# Patient Record
Sex: MALE | Race: WHITE | URBAN - METROPOLITAN AREA
[De-identification: names, ages, dates, MRNs, and addresses within clinical notes are randomized per-mention and may not be internally consistent; named-entity substitution may affect disease eponyms.]

---

## 2017-02-02 ENCOUNTER — EMERGENCY (EMERGENCY)
Facility: HOSPITAL | Age: 52
LOS: 1 days | Discharge: HOME | End: 2017-02-02
Attending: EMERGENCY MEDICINE | Admitting: EMERGENCY MEDICINE
Payer: COMMERCIAL

## 2017-02-02 VITALS
RESPIRATION RATE: 18 BRPM | TEMPERATURE: 98 F | HEART RATE: 80 BPM | OXYGEN SATURATION: 99 % | DIASTOLIC BLOOD PRESSURE: 73 MMHG | SYSTOLIC BLOOD PRESSURE: 134 MMHG

## 2017-02-02 VITALS — HEIGHT: 70 IN | WEIGHT: 225.09 LBS

## 2017-02-02 DIAGNOSIS — K57.92 DIVERTICULITIS OF INTESTINE, PART UNSPECIFIED, WITHOUT PERFORATION OR ABSCESS WITHOUT BLEEDING: ICD-10-CM

## 2017-02-02 DIAGNOSIS — R10.30 LOWER ABDOMINAL PAIN, UNSPECIFIED: ICD-10-CM

## 2017-02-02 LAB
ALBUMIN SERPL ELPH-MCNC: 3.8 G/DL — SIGNIFICANT CHANGE UP (ref 3.3–5)
ALP SERPL-CCNC: 67 U/L — SIGNIFICANT CHANGE UP (ref 40–120)
ALT FLD-CCNC: 63 U/L — SIGNIFICANT CHANGE UP (ref 12–78)
ANION GAP SERPL CALC-SCNC: 8 MMOL/L — SIGNIFICANT CHANGE UP (ref 5–17)
APPEARANCE UR: CLEAR — SIGNIFICANT CHANGE UP
AST SERPL-CCNC: 23 U/L — SIGNIFICANT CHANGE UP (ref 15–37)
BASOPHILS # BLD AUTO: 0 K/UL — SIGNIFICANT CHANGE UP (ref 0–0.2)
BASOPHILS NFR BLD AUTO: 0.5 % — SIGNIFICANT CHANGE UP (ref 0–2)
BILIRUB SERPL-MCNC: 0.4 MG/DL — SIGNIFICANT CHANGE UP (ref 0.2–1.2)
BILIRUB UR-MCNC: NEGATIVE — SIGNIFICANT CHANGE UP
BUN SERPL-MCNC: 8 MG/DL — SIGNIFICANT CHANGE UP (ref 7–23)
CALCIUM SERPL-MCNC: 8.9 MG/DL — SIGNIFICANT CHANGE UP (ref 8.5–10.1)
CHLORIDE SERPL-SCNC: 106 MMOL/L — SIGNIFICANT CHANGE UP (ref 96–108)
CO2 SERPL-SCNC: 25 MMOL/L — SIGNIFICANT CHANGE UP (ref 22–31)
COLOR SPEC: YELLOW — SIGNIFICANT CHANGE UP
CREAT SERPL-MCNC: 0.9 MG/DL — SIGNIFICANT CHANGE UP (ref 0.5–1.3)
DIFF PNL FLD: NEGATIVE — SIGNIFICANT CHANGE UP
EOSINOPHIL # BLD AUTO: 0 K/UL — SIGNIFICANT CHANGE UP (ref 0–0.5)
EOSINOPHIL NFR BLD AUTO: 0.3 % — SIGNIFICANT CHANGE UP (ref 0–6)
GLUCOSE SERPL-MCNC: 96 MG/DL — SIGNIFICANT CHANGE UP (ref 70–99)
GLUCOSE UR QL: NEGATIVE MG/DL — SIGNIFICANT CHANGE UP
HCT VFR BLD CALC: 42.6 % — SIGNIFICANT CHANGE UP (ref 39–50)
HGB BLD-MCNC: 14.7 G/DL — SIGNIFICANT CHANGE UP (ref 13–17)
KETONES UR-MCNC: NEGATIVE — SIGNIFICANT CHANGE UP
LEUKOCYTE ESTERASE UR-ACNC: NEGATIVE — SIGNIFICANT CHANGE UP
LIDOCAIN IGE QN: 150 U/L — SIGNIFICANT CHANGE UP (ref 73–393)
LYMPHOCYTES # BLD AUTO: 1.8 K/UL — SIGNIFICANT CHANGE UP (ref 1–3.3)
LYMPHOCYTES # BLD AUTO: 18.2 % — SIGNIFICANT CHANGE UP (ref 13–44)
MCHC RBC-ENTMCNC: 29.6 PG — SIGNIFICANT CHANGE UP (ref 27–34)
MCHC RBC-ENTMCNC: 34.6 GM/DL — SIGNIFICANT CHANGE UP (ref 32–36)
MCV RBC AUTO: 85.6 FL — SIGNIFICANT CHANGE UP (ref 80–100)
MONOCYTES # BLD AUTO: 0.5 K/UL — SIGNIFICANT CHANGE UP (ref 0–0.9)
MONOCYTES NFR BLD AUTO: 5.3 % — SIGNIFICANT CHANGE UP (ref 2–14)
NEUTROPHILS # BLD AUTO: 7.4 K/UL — SIGNIFICANT CHANGE UP (ref 1.8–7.4)
NEUTROPHILS NFR BLD AUTO: 75.7 % — SIGNIFICANT CHANGE UP (ref 43–77)
NITRITE UR-MCNC: NEGATIVE — SIGNIFICANT CHANGE UP
PH UR: 6 — SIGNIFICANT CHANGE UP (ref 4.8–8)
PLATELET # BLD AUTO: 284 K/UL — SIGNIFICANT CHANGE UP (ref 150–400)
POTASSIUM SERPL-MCNC: 4 MMOL/L — SIGNIFICANT CHANGE UP (ref 3.5–5.3)
POTASSIUM SERPL-SCNC: 4 MMOL/L — SIGNIFICANT CHANGE UP (ref 3.5–5.3)
PROT SERPL-MCNC: 6.9 GM/DL — SIGNIFICANT CHANGE UP (ref 6–8.3)
PROT UR-MCNC: NEGATIVE MG/DL — SIGNIFICANT CHANGE UP
RBC # BLD: 4.98 M/UL — SIGNIFICANT CHANGE UP (ref 4.2–5.8)
RBC # FLD: 11.5 % — SIGNIFICANT CHANGE UP (ref 10.3–14.5)
SODIUM SERPL-SCNC: 139 MMOL/L — SIGNIFICANT CHANGE UP (ref 135–145)
SP GR SPEC: 1.01 — SIGNIFICANT CHANGE UP (ref 1.01–1.02)
UROBILINOGEN FLD QL: NEGATIVE MG/DL — SIGNIFICANT CHANGE UP
WBC # BLD: 9.7 K/UL — SIGNIFICANT CHANGE UP (ref 3.8–10.5)
WBC # FLD AUTO: 9.7 K/UL — SIGNIFICANT CHANGE UP (ref 3.8–10.5)

## 2017-02-02 PROCEDURE — 74177 CT ABD & PELVIS W/CONTRAST: CPT | Mod: 26

## 2017-02-02 PROCEDURE — 99285 EMERGENCY DEPT VISIT HI MDM: CPT

## 2017-02-02 RX ORDER — ONDANSETRON 8 MG/1
4 TABLET, FILM COATED ORAL ONCE
Qty: 0 | Refills: 0 | Status: COMPLETED | OUTPATIENT
Start: 2017-02-02 | End: 2017-02-02

## 2017-02-02 RX ORDER — AMPICILLIN SODIUM AND SULBACTAM SODIUM 250; 125 MG/ML; MG/ML
3 INJECTION, POWDER, FOR SUSPENSION INTRAMUSCULAR; INTRAVENOUS ONCE
Qty: 0 | Refills: 0 | Status: COMPLETED | OUTPATIENT
Start: 2017-02-02 | End: 2017-02-02

## 2017-02-02 RX ORDER — MORPHINE SULFATE 50 MG/1
4 CAPSULE, EXTENDED RELEASE ORAL ONCE
Qty: 0 | Refills: 0 | Status: DISCONTINUED | OUTPATIENT
Start: 2017-02-02 | End: 2017-02-02

## 2017-02-02 RX ORDER — SODIUM CHLORIDE 9 MG/ML
3 INJECTION INTRAMUSCULAR; INTRAVENOUS; SUBCUTANEOUS ONCE
Qty: 0 | Refills: 0 | Status: COMPLETED | OUTPATIENT
Start: 2017-02-02 | End: 2017-02-02

## 2017-02-02 RX ORDER — SODIUM CHLORIDE 9 MG/ML
1000 INJECTION INTRAMUSCULAR; INTRAVENOUS; SUBCUTANEOUS ONCE
Qty: 0 | Refills: 0 | Status: COMPLETED | OUTPATIENT
Start: 2017-02-02 | End: 2017-02-02

## 2017-02-02 RX ADMIN — SODIUM CHLORIDE 1000 MILLILITER(S): 9 INJECTION INTRAMUSCULAR; INTRAVENOUS; SUBCUTANEOUS at 12:02

## 2017-02-02 RX ADMIN — AMPICILLIN SODIUM AND SULBACTAM SODIUM 200 GRAM(S): 250; 125 INJECTION, POWDER, FOR SUSPENSION INTRAMUSCULAR; INTRAVENOUS at 15:57

## 2017-02-02 RX ADMIN — MORPHINE SULFATE 4 MILLIGRAM(S): 50 CAPSULE, EXTENDED RELEASE ORAL at 12:02

## 2017-02-02 RX ADMIN — SODIUM CHLORIDE 3 MILLILITER(S): 9 INJECTION INTRAMUSCULAR; INTRAVENOUS; SUBCUTANEOUS at 12:03

## 2017-02-02 RX ADMIN — ONDANSETRON 4 MILLIGRAM(S): 8 TABLET, FILM COATED ORAL at 12:03

## 2017-02-02 RX ADMIN — MORPHINE SULFATE 4 MILLIGRAM(S): 50 CAPSULE, EXTENDED RELEASE ORAL at 15:57

## 2017-02-02 NOTE — ED PROVIDER NOTE - DETAILS:
The scribe's documentation has been prepared under my direction and personally reviewed by me in its entirety. I confirm that the note above accurately reflects all work, treatment, procedures, and medical decision making performed by me (Dr. Ballard).

## 2017-02-02 NOTE — ED PROVIDER NOTE - DIAGNOSIS COUNSELING, MDM
conducted a detailed discussion... I had a detailed discussion with the patient and/or guardian regarding the historical points, exam findings, and any diagnostic results supporting the discharge diagnosis.

## 2017-02-02 NOTE — ED PROVIDER NOTE - OBJECTIVE STATEMENT
50 y/o male with PMHx of diverticulosis, CAD s/p stent in 2009, on Plavix, daily baby ASA presents to the ED c/o suprapubic abd pain x14 days. +constipation 14 days ago. Pt went to Urgent Care and was prescribed Augmentin for 10 days. Pt took it with relief after 2 days. States he was getting better but not completely. Pt completed course 2 days ago. Abd pain has progressively worsened since last night, gradual onset. Ate pizza yesterday. Describes pain as sharp, pressure, gaseous, variable intensity from mild to severe. Exacerbated by movement. States he feels unable to empty his bowels. Drank prune last night which worsened pain but led to multiple BMs. Denies N/V, fever, dysuria. No urinary complaints. Prior diverticulosis trigger was apple ingestion due to diet change. Has been at least 2 years since last diverticulosis flare up, Has never been admitted or taken to surgery for it. For flare up he is usually given amoxicillin 850 and liquid/bland diet. Allergic to Cipro. PMD Dr. Bonilla.

## 2017-02-02 NOTE — ED PROVIDER NOTE - ENMT, MLM
Airway patent, Nasal mucosa clear. Mouth with mildly dry mucosa. Throat has no vesicles, no oropharyngeal exudates and uvula is midline.

## 2017-02-02 NOTE — ED PROVIDER NOTE - NS ED MD SCRIBE ATTENDING SCRIBE SECTIONS
PAST MEDICAL/SURGICAL/SOCIAL HISTORY/DISPOSITION/HISTORY OF PRESENT ILLNESS/VITAL SIGNS( Pullset)/REVIEW OF SYSTEMS/PHYSICAL EXAM

## 2017-02-02 NOTE — ED PROVIDER NOTE - PMH
CAD (coronary artery disease)    Diverticulosis of intestine without bleeding, unspecified intestinal tract location    Stented coronary artery

## 2017-02-02 NOTE — ED PROVIDER NOTE - CONSTITUTIONAL, MLM
normal... White male adult alert, no resp discomfort, no sentence shortening. Non toxic,. Mild discomfort due to abd pain.

## 2017-02-02 NOTE — ED PROVIDER NOTE - CARE PLAN
Principal Discharge DX:	Diverticulitis of sigmoid colon  Instructions for follow-up, activity and diet:	Avoid apples, roughage, whole tomatoes/cucumbers, nuts, seeds.

## 2017-02-02 NOTE — ED PROVIDER NOTE - CARDIAC, MLM
Normal rate, regular rhythm.  Heart sounds S1, S2.  No murmurs, rubs or gallops. Normal radial pulse.

## 2017-02-02 NOTE — ED PROVIDER NOTE - GASTROINTESTINAL, MLM
Abdomen soft. Hypoactive bowel sounds, normal pitch. +LLQ tenderness with involuntary guarding. Left sided obturator mildly positive.

## 2017-02-02 NOTE — ED PROVIDER NOTE - MEDICAL DECISION MAKING DETAILS
L abd pain worsened after finished oral course Abx for alleged diverticulitis exac.  + LLQ teneer, labs results reviewed.  CT + sigmoid diverticulitis, uncomplicated.  Pt received 1 dose IV Abx in ED & TBD on oral Abx, pain meds for outpt f/u.

## 2020-03-20 ENCOUNTER — RESULT REVIEW (OUTPATIENT)
Age: 55
End: 2020-03-20

## 2020-08-11 ENCOUNTER — EMERGENCY (EMERGENCY)
Facility: HOSPITAL | Age: 55
LOS: 0 days | Discharge: HOME | End: 2020-08-11
Attending: EMERGENCY MEDICINE
Payer: COMMERCIAL

## 2020-08-11 VITALS
TEMPERATURE: 99 F | HEART RATE: 86 BPM | SYSTOLIC BLOOD PRESSURE: 165 MMHG | DIASTOLIC BLOOD PRESSURE: 83 MMHG | OXYGEN SATURATION: 99 % | RESPIRATION RATE: 20 BRPM

## 2020-08-11 VITALS — HEIGHT: 69 IN | WEIGHT: 240.08 LBS

## 2020-08-11 DIAGNOSIS — R10.30 LOWER ABDOMINAL PAIN, UNSPECIFIED: ICD-10-CM

## 2020-08-11 DIAGNOSIS — I10 ESSENTIAL (PRIMARY) HYPERTENSION: ICD-10-CM

## 2020-08-11 DIAGNOSIS — E78.5 HYPERLIPIDEMIA, UNSPECIFIED: ICD-10-CM

## 2020-08-11 DIAGNOSIS — I25.10 ATHEROSCLEROTIC HEART DISEASE OF NATIVE CORONARY ARTERY WITHOUT ANGINA PECTORIS: ICD-10-CM

## 2020-08-11 DIAGNOSIS — Z88.1 ALLERGY STATUS TO OTHER ANTIBIOTIC AGENTS STATUS: ICD-10-CM

## 2020-08-11 DIAGNOSIS — Z79.2 LONG TERM (CURRENT) USE OF ANTIBIOTICS: ICD-10-CM

## 2020-08-11 DIAGNOSIS — K57.32 DIVERTICULITIS OF LARGE INTESTINE WITHOUT PERFORATION OR ABSCESS WITHOUT BLEEDING: ICD-10-CM

## 2020-08-11 DIAGNOSIS — R42 DIZZINESS AND GIDDINESS: ICD-10-CM

## 2020-08-11 DIAGNOSIS — Z79.891 LONG TERM (CURRENT) USE OF OPIATE ANALGESIC: ICD-10-CM

## 2020-08-11 DIAGNOSIS — F17.290 NICOTINE DEPENDENCE, OTHER TOBACCO PRODUCT, UNCOMPLICATED: ICD-10-CM

## 2020-08-11 DIAGNOSIS — Z87.19 PERSONAL HISTORY OF OTHER DISEASES OF THE DIGESTIVE SYSTEM: ICD-10-CM

## 2020-08-11 LAB
ALBUMIN SERPL ELPH-MCNC: 4.2 G/DL — SIGNIFICANT CHANGE UP (ref 3.3–5)
ALP SERPL-CCNC: 69 U/L — SIGNIFICANT CHANGE UP (ref 40–120)
ALT FLD-CCNC: 61 U/L — SIGNIFICANT CHANGE UP (ref 12–78)
ANION GAP SERPL CALC-SCNC: 8 MMOL/L — SIGNIFICANT CHANGE UP (ref 5–17)
APPEARANCE UR: CLEAR — SIGNIFICANT CHANGE UP
APTT BLD: 35.8 SEC — HIGH (ref 27.5–35.5)
AST SERPL-CCNC: 19 U/L — SIGNIFICANT CHANGE UP (ref 15–37)
BASOPHILS # BLD AUTO: 0.02 K/UL — SIGNIFICANT CHANGE UP (ref 0–0.2)
BASOPHILS NFR BLD AUTO: 0.2 % — SIGNIFICANT CHANGE UP (ref 0–2)
BILIRUB SERPL-MCNC: 0.5 MG/DL — SIGNIFICANT CHANGE UP (ref 0.2–1.2)
BILIRUB UR-MCNC: NEGATIVE — SIGNIFICANT CHANGE UP
BUN SERPL-MCNC: 11 MG/DL — SIGNIFICANT CHANGE UP (ref 7–23)
CALCIUM SERPL-MCNC: 8.9 MG/DL — SIGNIFICANT CHANGE UP (ref 8.5–10.1)
CHLORIDE SERPL-SCNC: 104 MMOL/L — SIGNIFICANT CHANGE UP (ref 96–108)
CO2 SERPL-SCNC: 25 MMOL/L — SIGNIFICANT CHANGE UP (ref 22–31)
COLOR SPEC: YELLOW — SIGNIFICANT CHANGE UP
CREAT SERPL-MCNC: 0.88 MG/DL — SIGNIFICANT CHANGE UP (ref 0.5–1.3)
DIFF PNL FLD: NEGATIVE — SIGNIFICANT CHANGE UP
EOSINOPHIL # BLD AUTO: 0.03 K/UL — SIGNIFICANT CHANGE UP (ref 0–0.5)
EOSINOPHIL NFR BLD AUTO: 0.3 % — SIGNIFICANT CHANGE UP (ref 0–6)
GLUCOSE SERPL-MCNC: 112 MG/DL — HIGH (ref 70–99)
GLUCOSE UR QL: NEGATIVE MG/DL — SIGNIFICANT CHANGE UP
HCT VFR BLD CALC: 41.9 % — SIGNIFICANT CHANGE UP (ref 39–50)
HGB BLD-MCNC: 14.6 G/DL — SIGNIFICANT CHANGE UP (ref 13–17)
IMM GRANULOCYTES NFR BLD AUTO: 0.5 % — SIGNIFICANT CHANGE UP (ref 0–1.5)
INR BLD: 1.07 RATIO — SIGNIFICANT CHANGE UP (ref 0.88–1.16)
KETONES UR-MCNC: ABNORMAL
LEUKOCYTE ESTERASE UR-ACNC: NEGATIVE — SIGNIFICANT CHANGE UP
LYMPHOCYTES # BLD AUTO: 0.87 K/UL — LOW (ref 1–3.3)
LYMPHOCYTES # BLD AUTO: 8.8 % — LOW (ref 13–44)
MCHC RBC-ENTMCNC: 29.4 PG — SIGNIFICANT CHANGE UP (ref 27–34)
MCHC RBC-ENTMCNC: 34.8 GM/DL — SIGNIFICANT CHANGE UP (ref 32–36)
MCV RBC AUTO: 84.3 FL — SIGNIFICANT CHANGE UP (ref 80–100)
MONOCYTES # BLD AUTO: 0.39 K/UL — SIGNIFICANT CHANGE UP (ref 0–0.9)
MONOCYTES NFR BLD AUTO: 3.9 % — SIGNIFICANT CHANGE UP (ref 2–14)
NEUTROPHILS # BLD AUTO: 8.58 K/UL — HIGH (ref 1.8–7.4)
NEUTROPHILS NFR BLD AUTO: 86.3 % — HIGH (ref 43–77)
NITRITE UR-MCNC: NEGATIVE — SIGNIFICANT CHANGE UP
PH UR: 5 — SIGNIFICANT CHANGE UP (ref 5–8)
PLATELET # BLD AUTO: 221 K/UL — SIGNIFICANT CHANGE UP (ref 150–400)
POTASSIUM SERPL-MCNC: 3.8 MMOL/L — SIGNIFICANT CHANGE UP (ref 3.5–5.3)
POTASSIUM SERPL-SCNC: 3.8 MMOL/L — SIGNIFICANT CHANGE UP (ref 3.5–5.3)
PROT SERPL-MCNC: 7.4 GM/DL — SIGNIFICANT CHANGE UP (ref 6–8.3)
PROT UR-MCNC: NEGATIVE MG/DL — SIGNIFICANT CHANGE UP
PROTHROM AB SERPL-ACNC: 12.5 SEC — SIGNIFICANT CHANGE UP (ref 10.6–13.6)
RBC # BLD: 4.97 M/UL — SIGNIFICANT CHANGE UP (ref 4.2–5.8)
RBC # FLD: 11.9 % — SIGNIFICANT CHANGE UP (ref 10.3–14.5)
SODIUM SERPL-SCNC: 137 MMOL/L — SIGNIFICANT CHANGE UP (ref 135–145)
SP GR SPEC: 1.01 — SIGNIFICANT CHANGE UP (ref 1.01–1.02)
UROBILINOGEN FLD QL: NEGATIVE MG/DL — SIGNIFICANT CHANGE UP
WBC # BLD: 9.94 K/UL — SIGNIFICANT CHANGE UP (ref 3.8–10.5)
WBC # FLD AUTO: 9.94 K/UL — SIGNIFICANT CHANGE UP (ref 3.8–10.5)

## 2020-08-11 PROCEDURE — 85610 PROTHROMBIN TIME: CPT

## 2020-08-11 PROCEDURE — 74177 CT ABD & PELVIS W/CONTRAST: CPT | Mod: 26

## 2020-08-11 PROCEDURE — 96374 THER/PROPH/DIAG INJ IV PUSH: CPT | Mod: XU

## 2020-08-11 PROCEDURE — 81003 URINALYSIS AUTO W/O SCOPE: CPT

## 2020-08-11 PROCEDURE — 96375 TX/PRO/DX INJ NEW DRUG ADDON: CPT | Mod: XU

## 2020-08-11 PROCEDURE — 36415 COLL VENOUS BLD VENIPUNCTURE: CPT

## 2020-08-11 PROCEDURE — 93005 ELECTROCARDIOGRAM TRACING: CPT

## 2020-08-11 PROCEDURE — 87040 BLOOD CULTURE FOR BACTERIA: CPT

## 2020-08-11 PROCEDURE — 85025 COMPLETE CBC W/AUTO DIFF WBC: CPT

## 2020-08-11 PROCEDURE — 74177 CT ABD & PELVIS W/CONTRAST: CPT

## 2020-08-11 PROCEDURE — 93010 ELECTROCARDIOGRAM REPORT: CPT

## 2020-08-11 PROCEDURE — 99284 EMERGENCY DEPT VISIT MOD MDM: CPT | Mod: 25

## 2020-08-11 PROCEDURE — 99284 EMERGENCY DEPT VISIT MOD MDM: CPT

## 2020-08-11 PROCEDURE — 80053 COMPREHEN METABOLIC PANEL: CPT

## 2020-08-11 PROCEDURE — 84484 ASSAY OF TROPONIN QUANT: CPT

## 2020-08-11 PROCEDURE — 85730 THROMBOPLASTIN TIME PARTIAL: CPT

## 2020-08-11 RX ORDER — METRONIDAZOLE 500 MG
1 TABLET ORAL
Qty: 30 | Refills: 0
Start: 2020-08-11 | End: 2020-08-20

## 2020-08-11 RX ORDER — MORPHINE SULFATE 50 MG/1
2 CAPSULE, EXTENDED RELEASE ORAL ONCE
Refills: 0 | Status: DISCONTINUED | OUTPATIENT
Start: 2020-08-11 | End: 2020-08-11

## 2020-08-11 RX ORDER — SODIUM CHLORIDE 9 MG/ML
1000 INJECTION INTRAMUSCULAR; INTRAVENOUS; SUBCUTANEOUS ONCE
Refills: 0 | Status: COMPLETED | OUTPATIENT
Start: 2020-08-11 | End: 2020-08-11

## 2020-08-11 RX ORDER — METRONIDAZOLE 500 MG
500 TABLET ORAL ONCE
Refills: 0 | Status: COMPLETED | OUTPATIENT
Start: 2020-08-11 | End: 2020-08-11

## 2020-08-11 RX ADMIN — MORPHINE SULFATE 2 MILLIGRAM(S): 50 CAPSULE, EXTENDED RELEASE ORAL at 22:15

## 2020-08-11 RX ADMIN — Medication 100 MILLIGRAM(S): at 21:39

## 2020-08-11 RX ADMIN — SODIUM CHLORIDE 1000 MILLILITER(S): 9 INJECTION INTRAMUSCULAR; INTRAVENOUS; SUBCUTANEOUS at 21:38

## 2020-08-11 RX ADMIN — Medication 1 TABLET(S): at 22:38

## 2020-08-11 NOTE — ED STATDOCS - NSFOLLOWUPCLINICS_GEN_ALL_ED_FT
[facility], [facility]  Family Medicine  [address] [place] [address]  Phone: [phone]  Fax:   Follow Up Time:

## 2020-08-11 NOTE — ED STATDOCS - CARDIAC, MLM
normal rate, regular rhythm, and no murmur. normal rate, regular rhythm, and no gallops or rubs. 2+ pulses in bilateral dp and radial arteries.

## 2020-08-11 NOTE — ED STATDOCS - PLAN OF CARE
hypodensity in liver, kidney, chronic inflammation - abx treatment  - IV hydration hypodensity in liver, and kidney, chronic inflammation - abx treatment  - IV hydration  - Offered admission but patient tolerating PO, outpatient follow up  - No cp, sob or palpitation, states lightheadedness similar to how he felt when he had his last diverticulitis. Trop negative x 1. Will follow up with cardiology return if any health concerns.

## 2020-08-11 NOTE — ED STATDOCS - CARE PLAN
Principal Discharge DX:	Sigmoid diverticulitis Principal Discharge DX:	Sigmoid diverticulitis  Goal:	hypodensity in liver, kidney, chronic inflammation  Assessment and plan of treatment:	- abx treatment  - IV hydration Principal Discharge DX:	Sigmoid diverticulitis  Goal:	hypodensity in liver, and kidney, chronic inflammation  Assessment and plan of treatment:	- abx treatment  - IV hydration  - Offered admission but patient tolerating PO, outpatient follow up  - No cp, sob or palpitation, states lightheadedness similar to how he felt when he had his last diverticulitis. Trop negative x 1. Will follow up with cardiology return if any health concerns.

## 2020-08-11 NOTE — ED STATDOCS - ATTENDING CONTRIBUTION TO CARE
I Chris Hutchinson MD saw and examined the patient. MLP saw and examined the patient under my supervision. I discussed the care of the patient with MLP and agree with MLP's plan, assessment and care of the patient while in the ED.

## 2020-08-11 NOTE — ED STATDOCS - PROGRESS NOTE DETAILS
56 yo male with a PMH of diverticulitis presents with LLQ pain since yesterday. Pt thought he tweaked his abd muscles when he was cleaning his pool  which progressively worsened over time. +lightheadedness and nausea. Pt states it feels like the last time he had diverticulitis which was approxiamtely 4-5 years ago. Denies fever, v/d, bloody stools. Labs, CTAP, IVF, Offered pain medication; however, pt refused. -Tato Pleitez PA-C Vitaliy Isaac for attending Dr. Hutchinson:  Spoke with Pt wife Miss Skylar ALMAGUER at [phone]. Pt gave me consent to discuss information with her, at this  ponit only available information is CBC and INR, will await results of troponin comprehensive metabolic panel and CT. Will eval pt and update wife again. Vitaliy Isaac for attending Dr. Hutchinson: Updated pt with results of labs, awaiting results of CT scan Evangelina FERNANDEZ: Updated patient on the results of the labs and the imaging. CT shows evidence of increasing in size hypodensity in the liver and hypodensity presence in the kidney, and evidence of acute sigmoid diverticulitis. Patient s/p abx. Offered patient admission if unable to tolerate PO or if pain is intractable. Patient is comfortable will follow up outpatient. As per patient's wife Skylar, Dr. Negron followed up with patient but patient states Dr. Negron likely wont cover his insurance. Will provide patient with Dr. Burden's information. Vitaliy Isaac for attending Dr. Hutchinson:  Spoke with Pt wife Ms. Skylar ALMAGUER at [phone]. Pt gave me consent to discuss information with her, at this  ponit only available information is CBC and INR, will await results of troponin comprehensive metabolic panel and CT. Will eval pt and update wife again. Evangelina FERNANDEZ: Updated patient on the results of the labs and the imaging. CT shows evidence of increasing in size hypodensity in the liver and hypodensity presence in the kidney, and evidence of acute sigmoid diverticulitis. Patient s/p abx. Offered patient admission if unable to tolerate PO or if pain is intractable. Patient is comfortable will follow up outpatient. As per patient's wife Skylar, Dr. Negron followed up with patient but patient states Dr. Negron likely wont cover his insurance. Will provide patient with Dr. Burden's information. Instructed patient to return if any chest pain, sob, palpitation or repeat lightheadedness or if any other health concerns. Also in depth educated patient about CT scan, and although it is an ideal imaging for emergent purposes it is not perfect in delineating certain soft tissue, or internal orgal findings. Discussed CT's incidental findings and instructed patient to follow up with PMD or gastro outpatient to further evaluate them.

## 2020-08-11 NOTE — ED STATDOCS - OBJECTIVE STATEMENT
56 y/o male with a PMHx of HTN, HLD, CAD s/p stented coronary artery, diverticulosis, presents to the ED c/o lower abdominal pain x2 days. Pt reports pain feels very similar to previous diverticulitis. Pt sates pain began yesterday when he was doing yard work. Notes episode of lightheadedness secondary to pain today, no current lightheadedness. Denies n/v/d., fever. No Hx of abdominal surgery. Notes last diverticulitis attack was 4-5 years ago. No other complaints at this time. (Cigar smoker, "sometimes 2-3 times a day" recently). No illicit drug use. Allergies: Cipro. Cardio: Dr. Mike Faust 56 y/o male with a PMHx of HTN, HLD, CAD s/p stented coronary artery, diverticulosis, presents to the ED c/o lower abdominal pain x2 days. Pt reports pain feels very similar to previous diverticulitis. Pt sates pain began yesterday when he was doing yard work. Notes episode of lightheadedness secondary to pain today, no current lightheadedness, no cp, sob or palpitation. Denies n/v/d, fever. No Hx of abdominal surgery. Notes last diverticulitis attack was 4-5 years ago. No other complaints at this time. (Cigar smoker, "sometimes 2-3 times a day" recently). No illicit drug use. Allergies: Cipro. Cardio: Dr. Mike Faust

## 2020-08-11 NOTE — ED STATDOCS - CONSTITUTIONAL [-], MLM
What Type Of Note Output Would You Prefer (Optional)?: Standard Output Hpi Title: Evaluation of Skin Lesions How Severe Are Your Spot(S)?: mild Have Your Spot(S) Been Treated In The Past?: has not been treated no fever

## 2020-08-11 NOTE — ED STATDOCS - MUSCULOSKELETAL, MLM
range of motion is not limited and there is no muscle tenderness. No nuchal rigidity. range of motion is not limited and there is no muscle tenderness. No nuchal rigidity. 5/5 strength on flexion and extension of all limbs. No nuchal rigidity.

## 2020-08-11 NOTE — ED ADULT NURSE NOTE - OBJECTIVE STATEMENT
Pt comes to the ED complaining of abdominal pain that started last night. Pt states that he was outside vacuuming the leaves off the bottom of the pool when he felt  the onset of the pain. pt states that he thought that he was just using abdominal muscles that he hadn't used in a while. pt states that today the pain continued and that the more he thought about it the less he thought that it was muscular and the more that he felt like it was a diverticulitis flare. Pt states that he has had 3 episodes of diverticulitis in the past. Pt states that he has not had anything to eat in the past 24 hours, but has been able to drink fluids.

## 2020-08-11 NOTE — ED STATDOCS - CLINICAL SUMMARY MEDICAL DECISION MAKING FREE TEXT BOX
IV access, basic blood work including lactate and troponin. CT with IV access, hydration and re eval after results of CT.

## 2020-08-11 NOTE — ED STATDOCS - NSFOLLOWUPINSTRUCTIONS_ED_ALL_ED_FT
Diverticulitis    WHAT YOU NEED TO KNOW:    Diverticulitis is a condition that causes small pockets along your intestine called diverticula to become inflamed or infected. This is caused by hard bowel movements, food, or bacteria that get stuck in the pockets.         DISCHARGE INSTRUCTIONS:    Return to the emergency department if:     You have bowel movement or foul-smelling discharge leaking from your vagina or in your urine.      You have severe diarrhea.      You urinate less than usual or not at all.      You are not able to have a bowel movement.      You cannot stop vomiting.       You have severe abdominal pain, a fever, and your abdomen is larger than usual.       You have new or increased blood in your bowel movements.     Contact your healthcare provider if:     You have pain when you urinate.      Your symptoms get worse or do not go away.       You have questions or concerns about your condition or care.     Medicines:     Antibiotics may be given to help treat a bacterial infection.      Prescription pain medicine may be given. Ask your healthcare provider how to take this medicine safely. Some prescription pain medicines contain acetaminophen. Do not take other medicines that contain acetaminophen without talking to your healthcare provider. Too much acetaminophen may cause liver damage. Prescription pain medicine may cause constipation. Ask your healthcare provider how to prevent or treat constipation.       Take your medicine as directed. Contact your healthcare provider if you think your medicine is not helping or if you have side effects. Tell him or her if you are allergic to any medicine. Keep a list of the medicines, vitamins, and herbs you take. Include the amounts, and when and why you take them. Bring the list or the pill bottles to follow-up visits. Carry your medicine list with you in case of an emergency.    Clear liquid diet: A clear liquid diet includes any liquids that you can see through. Examples include water, ginger-deidra, cranberry or apple juice, frozen fruit ice, or broth. Stay on a clear liquid diet until your symptoms are gone, or as directed.     Follow up with your healthcare provider as directed: You may need to return for a colonoscopy. When your symptoms are gone, you may need a low-fat, high-fiber diet to prevent diverticulitis from developing again. Your healthcare provider or dietitian can help you create meal plans. Write down your questions so you remember to ask them during your visits. Please return to us immediately if you have any worsening of the symptoms. If you are unable to take your antibiotics or if you can not eat or drink, or if you are unable to see your gastroenterologist or primary care physician then return to us immediately.    Please note that you are taking two antibiotics for treatment of the inflammation in the bowel. All medications have potential side effects. Please consult your pharmacist prior to taking the antibiotics and return to us if you experience any side effects. Also please return to us immediately if you have any allergic reactions which can include swelling of face, tongue, or lips.     As we discussed CT scans although perfect imaging for the emergent purposes they are designed for are not the most inclusive testing and that if you have any other issues you need to follow up with your primary care physician and gastroenterologist and might need an MRI.    Please take your antibiotics and return to us immediately if any worsening of the symptoms, inability to eat or drink, inability to take your antibiotics by mouth or if any health concerns or issues.  ______________________    Diverticulitis    WHAT YOU NEED TO KNOW:    Diverticulitis is a condition that causes small pockets along your intestine called diverticula to become inflamed or infected. This is caused by hard bowel movements, food, or bacteria that get stuck in the pockets.         DISCHARGE INSTRUCTIONS:    Return to the emergency department if:     You have bowel movement or foul-smelling discharge leaking from your vagina or in your urine.      You have severe diarrhea.      You urinate less than usual or not at all.      You are not able to have a bowel movement.      You cannot stop vomiting.       You have severe abdominal pain, a fever, and your abdomen is larger than usual.       You have new or increased blood in your bowel movements.     Contact your healthcare provider if:     You have pain when you urinate.      Your symptoms get worse or do not go away.       You have questions or concerns about your condition or care.     Medicines:     Antibiotics may be given to help treat a bacterial infection.      Prescription pain medicine may be given. Ask your healthcare provider how to take this medicine safely. Some prescription pain medicines contain acetaminophen. Do not take other medicines that contain acetaminophen without talking to your healthcare provider. Too much acetaminophen may cause liver damage. Prescription pain medicine may cause constipation. Ask your healthcare provider how to prevent or treat constipation.       Take your medicine as directed. Contact your healthcare provider if you think your medicine is not helping or if you have side effects. Tell him or her if you are allergic to any medicine. Keep a list of the medicines, vitamins, and herbs you take. Include the amounts, and when and why you take them. Bring the list or the pill bottles to follow-up visits. Carry your medicine list with you in case of an emergency.    Clear liquid diet: A clear liquid diet includes any liquids that you can see through. Examples include water, ginger-deidra, cranberry or apple juice, frozen fruit ice, or broth. Stay on a clear liquid diet until your symptoms are gone, or as directed.     Follow up with your healthcare provider as directed: You may need to return for a colonoscopy. When your symptoms are gone, you may need a low-fat, high-fiber diet to prevent diverticulitis from developing again. Your healthcare provider or dietitian can help you create meal plans. Write down your questions so you remember to ask them during your visits.

## 2020-08-11 NOTE — ED ADULT TRIAGE NOTE - CHIEF COMPLAINT QUOTE
Ambulatory with PMH of diverticulitis having abdominal pain. Denies N/V/D just reports pain at this time and reports having approximately 4 "attacks" in the past and it resolving with fast administration of ABx. Allergy to Cipro. PMH of stents and HTN.

## 2020-08-11 NOTE — ED STATDOCS - NS_ ATTENDINGSCRIBEDETAILS _ED_A_ED_FT
I Chris Hutchinson MD saw and examined the patient. Scribe documented for me and under my supervision. I have modified the scribe's documentation where necessary to reflect my history, physical exam and other relevant documentations pertinent to the care of the patient.

## 2020-08-11 NOTE — ED STATDOCS - CARE PROVIDER_API CALL
Anthony Burden  GASTROENTEROLOGY  [address] [place] [address]  Phone: [phone]  Fax: [phone]  Follow Up Time:

## 2020-08-11 NOTE — ED STATDOCS - PATIENT PORTAL LINK FT
home
You can access the FollowMyHealth Patient Portal offered by [facility] by registering at the following website: [email or web address]. By joining [facility]’s FollowMyHealth portal, you will also be able to view your health information using other applications (apps) compatible with our system.

## 2020-08-11 NOTE — ED STATDOCS - CONSTITUTIONAL, MLM
normal... well appearing and in no apparent distress. nontoxic appearing well appearing and in no apparent distress. nontoxic appearing. NAD.

## 2020-08-12 PROBLEM — Z95.5 PRESENCE OF CORONARY ANGIOPLASTY IMPLANT AND GRAFT: Chronic | Status: ACTIVE | Noted: 2017-02-02

## 2020-08-12 PROBLEM — I25.10 ATHEROSCLEROTIC HEART DISEASE OF NATIVE CORONARY ARTERY WITHOUT ANGINA PECTORIS: Chronic | Status: ACTIVE | Noted: 2017-02-02

## 2020-08-12 PROBLEM — K57.90 DIVERTICULOSIS OF INTESTINE, PART UNSPECIFIED, WITHOUT PERFORATION OR ABSCESS WITHOUT BLEEDING: Chronic | Status: ACTIVE | Noted: 2017-02-02

## 2020-08-17 LAB
CULTURE RESULTS: SIGNIFICANT CHANGE UP
CULTURE RESULTS: SIGNIFICANT CHANGE UP
SPECIMEN SOURCE: SIGNIFICANT CHANGE UP
SPECIMEN SOURCE: SIGNIFICANT CHANGE UP

## 2021-03-16 ENCOUNTER — EMERGENCY (EMERGENCY)
Facility: HOSPITAL | Age: 56
LOS: 0 days | Discharge: HOME | End: 2021-03-17
Attending: EMERGENCY MEDICINE
Payer: COMMERCIAL

## 2021-03-16 VITALS — HEIGHT: 69 IN | WEIGHT: 225.09 LBS

## 2021-03-16 DIAGNOSIS — K57.90 DIVERTICULOSIS OF INTESTINE, PART UNSPECIFIED, WITHOUT PERFORATION OR ABSCESS WITHOUT BLEEDING: ICD-10-CM

## 2021-03-16 DIAGNOSIS — Z79.891 LONG TERM (CURRENT) USE OF OPIATE ANALGESIC: ICD-10-CM

## 2021-03-16 DIAGNOSIS — I25.10 ATHEROSCLEROTIC HEART DISEASE OF NATIVE CORONARY ARTERY WITHOUT ANGINA PECTORIS: ICD-10-CM

## 2021-03-16 DIAGNOSIS — Z88.1 ALLERGY STATUS TO OTHER ANTIBIOTIC AGENTS STATUS: ICD-10-CM

## 2021-03-16 DIAGNOSIS — R11.0 NAUSEA: ICD-10-CM

## 2021-03-16 DIAGNOSIS — Z95.5 PRESENCE OF CORONARY ANGIOPLASTY IMPLANT AND GRAFT: ICD-10-CM

## 2021-03-16 DIAGNOSIS — Z87.19 PERSONAL HISTORY OF OTHER DISEASES OF THE DIGESTIVE SYSTEM: ICD-10-CM

## 2021-03-16 DIAGNOSIS — R10.9 UNSPECIFIED ABDOMINAL PAIN: ICD-10-CM

## 2021-03-16 LAB
ALBUMIN SERPL ELPH-MCNC: 4 G/DL — SIGNIFICANT CHANGE UP (ref 3.3–5)
ALP SERPL-CCNC: 65 U/L — SIGNIFICANT CHANGE UP (ref 40–120)
ALT FLD-CCNC: 63 U/L — SIGNIFICANT CHANGE UP (ref 12–78)
ANION GAP SERPL CALC-SCNC: 6 MMOL/L — SIGNIFICANT CHANGE UP (ref 5–17)
AST SERPL-CCNC: 20 U/L — SIGNIFICANT CHANGE UP (ref 15–37)
BASOPHILS # BLD AUTO: 0.04 K/UL — SIGNIFICANT CHANGE UP (ref 0–0.2)
BASOPHILS NFR BLD AUTO: 0.5 % — SIGNIFICANT CHANGE UP (ref 0–2)
BILIRUB SERPL-MCNC: 0.5 MG/DL — SIGNIFICANT CHANGE UP (ref 0.2–1.2)
BUN SERPL-MCNC: 17 MG/DL — SIGNIFICANT CHANGE UP (ref 7–23)
CALCIUM SERPL-MCNC: 8.9 MG/DL — SIGNIFICANT CHANGE UP (ref 8.5–10.1)
CHLORIDE SERPL-SCNC: 106 MMOL/L — SIGNIFICANT CHANGE UP (ref 96–108)
CO2 SERPL-SCNC: 28 MMOL/L — SIGNIFICANT CHANGE UP (ref 22–31)
CREAT SERPL-MCNC: 1.02 MG/DL — SIGNIFICANT CHANGE UP (ref 0.5–1.3)
EOSINOPHIL # BLD AUTO: 0.08 K/UL — SIGNIFICANT CHANGE UP (ref 0–0.5)
EOSINOPHIL NFR BLD AUTO: 0.9 % — SIGNIFICANT CHANGE UP (ref 0–6)
GLUCOSE SERPL-MCNC: 113 MG/DL — HIGH (ref 70–99)
HCT VFR BLD CALC: 43 % — SIGNIFICANT CHANGE UP (ref 39–50)
HGB BLD-MCNC: 14.6 G/DL — SIGNIFICANT CHANGE UP (ref 13–17)
IMM GRANULOCYTES NFR BLD AUTO: 0.5 % — SIGNIFICANT CHANGE UP (ref 0–1.5)
LIDOCAIN IGE QN: 165 U/L — SIGNIFICANT CHANGE UP (ref 73–393)
LYMPHOCYTES # BLD AUTO: 2.12 K/UL — SIGNIFICANT CHANGE UP (ref 1–3.3)
LYMPHOCYTES # BLD AUTO: 24.5 % — SIGNIFICANT CHANGE UP (ref 13–44)
MCHC RBC-ENTMCNC: 29.6 PG — SIGNIFICANT CHANGE UP (ref 27–34)
MCHC RBC-ENTMCNC: 34 GM/DL — SIGNIFICANT CHANGE UP (ref 32–36)
MCV RBC AUTO: 87.2 FL — SIGNIFICANT CHANGE UP (ref 80–100)
MONOCYTES # BLD AUTO: 0.57 K/UL — SIGNIFICANT CHANGE UP (ref 0–0.9)
MONOCYTES NFR BLD AUTO: 6.6 % — SIGNIFICANT CHANGE UP (ref 2–14)
NEUTROPHILS # BLD AUTO: 5.79 K/UL — SIGNIFICANT CHANGE UP (ref 1.8–7.4)
NEUTROPHILS NFR BLD AUTO: 67 % — SIGNIFICANT CHANGE UP (ref 43–77)
PLATELET # BLD AUTO: 223 K/UL — SIGNIFICANT CHANGE UP (ref 150–400)
POTASSIUM SERPL-MCNC: 4.3 MMOL/L — SIGNIFICANT CHANGE UP (ref 3.5–5.3)
POTASSIUM SERPL-SCNC: 4.3 MMOL/L — SIGNIFICANT CHANGE UP (ref 3.5–5.3)
PROT SERPL-MCNC: 6.9 GM/DL — SIGNIFICANT CHANGE UP (ref 6–8.3)
RBC # BLD: 4.93 M/UL — SIGNIFICANT CHANGE UP (ref 4.2–5.8)
RBC # FLD: 12.2 % — SIGNIFICANT CHANGE UP (ref 10.3–14.5)
SODIUM SERPL-SCNC: 140 MMOL/L — SIGNIFICANT CHANGE UP (ref 135–145)
WBC # BLD: 8.64 K/UL — SIGNIFICANT CHANGE UP (ref 3.8–10.5)
WBC # FLD AUTO: 8.64 K/UL — SIGNIFICANT CHANGE UP (ref 3.8–10.5)

## 2021-03-16 PROCEDURE — 85025 COMPLETE CBC W/AUTO DIFF WBC: CPT

## 2021-03-16 PROCEDURE — 96374 THER/PROPH/DIAG INJ IV PUSH: CPT | Mod: XU

## 2021-03-16 PROCEDURE — 83690 ASSAY OF LIPASE: CPT

## 2021-03-16 PROCEDURE — 99284 EMERGENCY DEPT VISIT MOD MDM: CPT | Mod: 25

## 2021-03-16 PROCEDURE — 99285 EMERGENCY DEPT VISIT HI MDM: CPT

## 2021-03-16 PROCEDURE — 96375 TX/PRO/DX INJ NEW DRUG ADDON: CPT

## 2021-03-16 PROCEDURE — 74177 CT ABD & PELVIS W/CONTRAST: CPT | Mod: 26

## 2021-03-16 PROCEDURE — 36415 COLL VENOUS BLD VENIPUNCTURE: CPT

## 2021-03-16 PROCEDURE — 74177 CT ABD & PELVIS W/CONTRAST: CPT

## 2021-03-16 PROCEDURE — 80053 COMPREHEN METABOLIC PANEL: CPT

## 2021-03-16 RX ORDER — MORPHINE SULFATE 50 MG/1
4 CAPSULE, EXTENDED RELEASE ORAL ONCE
Refills: 0 | Status: DISCONTINUED | OUTPATIENT
Start: 2021-03-16 | End: 2021-03-16

## 2021-03-16 RX ORDER — SODIUM CHLORIDE 9 MG/ML
1000 INJECTION INTRAMUSCULAR; INTRAVENOUS; SUBCUTANEOUS ONCE
Refills: 0 | Status: COMPLETED | OUTPATIENT
Start: 2021-03-16 | End: 2021-03-16

## 2021-03-16 RX ORDER — ONDANSETRON 8 MG/1
4 TABLET, FILM COATED ORAL ONCE
Refills: 0 | Status: COMPLETED | OUTPATIENT
Start: 2021-03-16 | End: 2021-03-16

## 2021-03-16 RX ADMIN — MORPHINE SULFATE 4 MILLIGRAM(S): 50 CAPSULE, EXTENDED RELEASE ORAL at 23:10

## 2021-03-16 RX ADMIN — SODIUM CHLORIDE 1000 MILLILITER(S): 9 INJECTION INTRAMUSCULAR; INTRAVENOUS; SUBCUTANEOUS at 23:10

## 2021-03-16 RX ADMIN — ONDANSETRON 4 MILLIGRAM(S): 8 TABLET, FILM COATED ORAL at 23:10

## 2021-03-16 NOTE — ED ADULT NURSE REASSESSMENT NOTE - NS ED NURSE REASSESS COMMENT FT1
received pt from intake, pt is a&0x4, airway is patient and shows no sings of respiratory distress, pt is complaining of abd pain at this time v/s stable, call bell within reach, bed in lowest position

## 2021-03-16 NOTE — ED PROVIDER NOTE - CARE PROVIDER_API CALL
Montana Perez)  Internal Medicine  [address] [place] [address]  Phone: [phone]  Fax: [phone]  Follow Up Time:

## 2021-03-16 NOTE — ED PROVIDER NOTE - NS ED SCRIBE STATEMENT
December 10, 2018      Juni Goyal MD  [address]  [address]  [facility] [address]           [place] - Podiatry  [address] [address]  Phone: [phone]  Fax: [phone]          Patient: Kwaku Ricks Jr.   MR Number: [id number]   YOB: 1948   Date of Visit: 12/6/2018       Dear Dr. Juni Goyal:    Thank you for referring Kwaku Ricks to me for evaluation. Attached you will find relevant portions of my assessment and plan of care.    If you have questions, please do not hesitate to call me. I look forward to following Kwaku Ricks along with you.    Sincerely,    Candy Hope, CALLIE    Enclosure  CC:  No Recipients    If you would like to receive this communication electronically, please contact [email or web address] or [phone] to request more information on [employer] access.    For providers and/or their staff who would like to refer a patient to [facility], please contact us through our one-stop-shop provider referral line, [facility], at [phone].    If you feel you have received this communication in error or would no longer like to receive these types of communications, please e-mail [email or web address]          Attending

## 2021-03-16 NOTE — ED PROVIDER NOTE - OBJECTIVE STATEMENT
54 y/o male with PMHx of diverticulitis, CAD present to the ED c/o abd pain, onset x4 hours ago. Began on left side radiated to right. Associated nausea. States this feels like prior diverticulitis. Allergic to cipro. PMD: Dr. Perez.

## 2021-03-16 NOTE — ED PROVIDER NOTE - PATIENT PORTAL LINK FT
You can access the FollowMyHealth Patient Portal offered by [facility] by registering at the following website: [email or web address]. By joining [employer]’s FollowMyHealth portal, you will also be able to view your health information using other applications (apps) compatible with our system.

## 2021-03-16 NOTE — ED ADULT TRIAGE NOTE - CHIEF COMPLAINT QUOTE
Pt presents to the ED with generalized abdominal pain beginning around 1800. Denies N/V/D/fevers. Endorses hx of diverticulitis and states this feels the same.

## 2021-03-16 NOTE — ED PROVIDER NOTE - PROGRESS NOTE DETAILS
pt with uncomplicated diverticulitis, will give augmentin as pt states he may be allergic to cipro, will d/c to follow up with pcp and bobo Kemp DO

## 2021-03-17 VITALS
OXYGEN SATURATION: 100 % | TEMPERATURE: 98 F | DIASTOLIC BLOOD PRESSURE: 78 MMHG | HEART RATE: 80 BPM | SYSTOLIC BLOOD PRESSURE: 130 MMHG | RESPIRATION RATE: 18 BRPM

## 2021-03-17 RX ORDER — KETOROLAC TROMETHAMINE 30 MG/ML
30 SYRINGE (ML) INJECTION ONCE
Refills: 0 | Status: DISCONTINUED | OUTPATIENT
Start: 2021-03-17 | End: 2021-03-17

## 2021-03-17 RX ADMIN — Medication 30 MILLIGRAM(S): at 00:58

## 2021-03-17 RX ADMIN — Medication 1 TABLET(S): at 00:57

## 2021-03-17 NOTE — ED ADULT NURSE NOTE - OBJECTIVE STATEMENT
54 y/o a&0x4 pt comes in with c/o of abd pain, he rates his pain at a 8, cardiac monitor reads NS rhythm, airway patent and no signs of respiratory distress.

## 2021-07-25 ENCOUNTER — EMERGENCY (EMERGENCY)
Facility: HOSPITAL | Age: 56
LOS: 0 days | Discharge: HOME | End: 2021-07-25
Attending: STUDENT IN AN ORGANIZED HEALTH CARE EDUCATION/TRAINING PROGRAM
Payer: COMMERCIAL

## 2021-07-25 VITALS
OXYGEN SATURATION: 100 % | RESPIRATION RATE: 18 BRPM | TEMPERATURE: 98 F | DIASTOLIC BLOOD PRESSURE: 91 MMHG | SYSTOLIC BLOOD PRESSURE: 154 MMHG | HEART RATE: 71 BPM

## 2021-07-25 VITALS — HEIGHT: 69 IN | WEIGHT: 225.09 LBS

## 2021-07-25 DIAGNOSIS — K57.92 DIVERTICULITIS OF INTESTINE, PART UNSPECIFIED, WITHOUT PERFORATION OR ABSCESS WITHOUT BLEEDING: ICD-10-CM

## 2021-07-25 DIAGNOSIS — Z95.5 PRESENCE OF CORONARY ANGIOPLASTY IMPLANT AND GRAFT: ICD-10-CM

## 2021-07-25 DIAGNOSIS — R10.32 LEFT LOWER QUADRANT PAIN: ICD-10-CM

## 2021-07-25 DIAGNOSIS — Z88.1 ALLERGY STATUS TO OTHER ANTIBIOTIC AGENTS STATUS: ICD-10-CM

## 2021-07-25 DIAGNOSIS — Z87.19 PERSONAL HISTORY OF OTHER DISEASES OF THE DIGESTIVE SYSTEM: ICD-10-CM

## 2021-07-25 DIAGNOSIS — I25.10 ATHEROSCLEROTIC HEART DISEASE OF NATIVE CORONARY ARTERY WITHOUT ANGINA PECTORIS: ICD-10-CM

## 2021-07-25 PROCEDURE — 99283 EMERGENCY DEPT VISIT LOW MDM: CPT

## 2021-07-25 RX ORDER — ACETAMINOPHEN 500 MG
650 TABLET ORAL ONCE
Refills: 0 | Status: COMPLETED | OUTPATIENT
Start: 2021-07-25 | End: 2021-07-25

## 2021-07-25 RX ORDER — OXYCODONE HYDROCHLORIDE 5 MG/1
5 TABLET ORAL ONCE
Refills: 0 | Status: DISCONTINUED | OUTPATIENT
Start: 2021-07-25 | End: 2021-07-25

## 2021-07-25 RX ADMIN — OXYCODONE HYDROCHLORIDE 5 MILLIGRAM(S): 5 TABLET ORAL at 18:50

## 2021-07-25 RX ADMIN — Medication 1 TABLET(S): at 18:50

## 2021-07-25 RX ADMIN — OXYCODONE HYDROCHLORIDE 5 MILLIGRAM(S): 5 TABLET ORAL at 19:01

## 2021-07-25 RX ADMIN — Medication 650 MILLIGRAM(S): at 18:49

## 2021-07-25 NOTE — ED STATDOCS - MUSCULOSKELETAL, MLM
range of motion is not limited,  focally tender llq, not peritoneal range of motion is not limited, no midline ttp

## 2021-07-25 NOTE — ED STATDOCS - GASTROINTESTINAL, MLM
abdomen soft, non-tender, and non-distended. Bowel sounds present. focally tender LLQ, not peritoneal, no masses felt

## 2021-07-25 NOTE — ED ADULT NURSE NOTE - CAS DISCH ACCOMP BY
"NAL Progress Note  Lizet Webster  [id number]  1974    6/8/2018    Reason for visit:  ESRD    ROS:    Pulm:  No SOA  CV:  No CP    I&O:    Intake/Output Summary (Last 24 hours) at 06/10/18 0957  Last data filed at 06/10/18 0829   Gross per 24 hour   Intake          1941.55 ml   Output             1410 ml   Net           531.55 ml       PE:   Blood pressure 94/61, pulse 98, temperature 98.9 °F (37.2 °C), temperature source Axillary, resp. rate 28, height 157 cm (61.81\"), weight 61 kg (134 lb 7.7 oz), SpO2 97 %, not currently breastfeeding.    GENERAL:      Awake and alert, in no acute distress. Intubated               GENERAL: well developed WW              HEENT: Pupils small equal reactive. Tracheostomy              LUNGS: breath sounds bilateral, clear anteriorly              HEART: regular rhythm. Normal S1S2, no murmer              ABDOMEN: PEG. Bowel sounds present, soft              EXTREMITIES: LUE surgical wound intact over AVF, trace edema              NEURO/PSYCH: sedates              SKIN: CERVICAL INCISION SIZE OF AN ERASER, TUNNELING. THE WOUND WAS PACKED WITH 1/2    Medications:    Current Facility-Administered Medications:   •  acetaminophen (TYLENOL) tablet 650 mg, 650 mg, Oral, Q4H PRN **OR** [DISCONTINUED] acetaminophen (TYLENOL) suppository 650 mg, 650 mg, Rectal, Q4H PRN, Jyothi Carrasco, LAYTON  •  [START ON 6/11/2018] albumin human 25 % IV SOLN 12.5 g, 12.5 g, Intravenous, PRN, Narayan Holguin MD  •  ALPRAZolam (XANAX) tablet 0.5 mg, 0.5 mg, Oral, TID PRN, Narinder Arroyo MD  •  ceFAZolin in dextrose (ANCEF) IVPB solution 2 g, 2 g, Intravenous, Q24H, Chris Tyler IV, RPH, 2 g at 06/09/18 1625  •  chlorhexidine (PERIDEX) 0.12 % solution 15 mL, 15 mL, Mouth/Throat, Q12H, Jyothi Carrasco, APRN, 15 mL at 06/10/18 0826  •  DULoxetine (CYMBALTA) DR capsule 20 mg, 20 mg, Oral, Daily, Jyothi Carrasco, LAYTON, 20 mg at 06/10/18 0824  •  epoetin mini (EPOGEN,PROCRIT) injection 10,000 Units, 10,000 " Units, Subcutaneous, Once per day on Mon Wed Fri, LAYTON Souza, 10,000 Units at 06/08/18 1134  •  fentaNYL citrate (PF) (SUBLIMAZE) injection 50 mcg, 50 mcg, Intravenous, Q5 Min PRN, Aguila Roman CRNA, 50 mcg at 06/09/18 1519  •  gabapentin (NEURONTIN) capsule 400 mg, 400 mg, Oral, Nightly, LAYTON Souza, 400 mg at 06/09/18 2024  •  heparin (porcine) 5000 UNIT/ML injection 5,000 Units, 5,000 Units, Subcutaneous, Q8H, Jyothi Carrasco APRN, 5,000 Units at 06/10/18 0602  •  hydrALAZINE (APRESOLINE) injection 5 mg, 5 mg, Intravenous, Q10 Min PRN, Aguila Roman CRNA  •  insulin regular (humuLIN R,novoLIN R) injection 0-14 Units, 0-14 Units, Subcutaneous, Q6H, LAYTON Souza, 3 Units at 06/09/18 1814  •  ipratropium-albuterol (DUO-NEB) nebulizer solution 3 mL, 3 mL, Nebulization, 4x Daily - RT, Ap Wise MD, 3 mL at 06/10/18 0757  •  ipratropium-albuterol (DUO-NEB) nebulizer solution 3 mL, 3 mL, Nebulization, Once PRN, Aguila Roman CRNA  •  labetalol (NORMODYNE,TRANDATE) injection 5 mg, 5 mg, Intravenous, Q5 Min PRN, Aguila Roman CRNA  •  lactated ringers bolus 500 mL, 500 mL, Intravenous, Once PRN, Aguila Roman CRNA  •  levothyroxine (SYNTHROID, LEVOTHROID) tablet 75 mcg, 75 mcg, Oral, Q AM, LAYTON Souza, 75 mcg at 06/10/18 0602  •  lidocaine (LIDODERM) 5 % 1 patch, 1 patch, Transdermal, Q24H, LAYTON Souza, 1 patch at 06/10/18 0825  •  lidocaine PF 1% (XYLOCAINE) injection 0.5 mL, 0.5 mL, Injection, Once PRN, Luis Lawson MD  •  micafungin 100 mg/100 mL 0.9% NS IVPB (mbp), 100 mg, Intravenous, Q24H, LAYTON Souza, 100 mg at 06/09/18 1146  •  midodrine (PROAMATINE) tablet 2.5 mg, 2.5 mg, Oral, BID, LAYTON Souza, 2.5 mg at 06/10/18 0824  •  midodrine (PROAMATINE) tablet 5 mg, 5 mg, Oral, TID, Narayan Holguin MD  •  naloxone (NARCAN) injection 0.4 mg, 0.4 mg, Intravenous, PRN, Aguila Roman CRNA  •  nystatin (MYCOSTATIN) powder, , Topical, Q12H, Ap Wise,  MD  •  ondansetron (ZOFRAN) injection 4 mg, 4 mg, Intravenous, Once PRN, Aguila Roman CRNA  •  oxyCODONE-acetaminophen (PERCOCET) 7.5-325 MG per tablet 1 tablet, 1 tablet, Oral, Once PRN, Aguila Roman CRNA, 1 tablet at 06/09/18 2024  •  pantoprazole (PROTONIX) injection 40 mg, 40 mg, Intravenous, Q AM, Eulalio Prajapati, [facility], 40 mg at 06/10/18 0602  •  promethazine (PHENERGAN) injection 6.25 mg, 6.25 mg, Intravenous, Once PRN **OR** promethazine (PHENERGAN) injection 6.25 mg, 6.25 mg, Intramuscular, Once PRN **OR** promethazine (PHENERGAN) suppository 25 mg, 25 mg, Rectal, Once PRN **OR** promethazine (PHENERGAN) tablet 25 mg, 25 mg, Oral, Once PRN, Aguila Roman CRNA  •  sertraline (ZOLOFT) tablet 100 mg, 100 mg, Oral, Daily, Jyothi Carrasco APRN, 100 mg at 06/10/18 0824  •  sodium chloride 0.9 % flush 1-10 mL, 1-10 mL, Intravenous, PRN, Jyothi Carrasco APRN  •  sodium chloride 0.9 % flush 1-10 mL, 1-10 mL, Intravenous, PRN, Luis Lawson MD  •  sodium chloride 0.9 % flush 1-10 mL, 1-10 mL, Intravenous, PRN, Aguila Roman CRNA  •  traMADol (ULTRAM) tablet 50 mg, 50 mg, Oral, Q6H PRN, Jyothi Carrasco APRN, 50 mg at 06/10/18 0826    Meds reviewed and doses adjusted for renal function    Labs:    Results from last 7 days  Lab Units 06/10/18  0449 06/09/18  0753 06/08/18  0420 06/07/18  0311 06/06/18  0218 06/05/18  0106 06/04/18  0247   WBC 10*3/mm3 8.24 8.18 10.30 9.88 8.33  --  13.13*   HEMOGLOBIN g/dL 7.3* 7.0* 8.3* 7.9* 8.0*  --  7.7*   HEMATOCRIT % 23.8* 22.6* 27.3* 24.5* 23.5*  --  24.2*   PLATELETS 10*3/mm3 192 194 266 304 282  --  302   SODIUM mmol/L 138 140 139 136 139 137 135   POTASSIUM mmol/L 4.3 3.2* 4.5 3.4* 3.4* 4.5 3.7   CHLORIDE mmol/L 104 105 103 98* 97* 97* 98*   CO2 mmol/L 21.0 23.0 22.0 24.7 28.8 24.3 26.4   BUN mg/dL 43* 31* 59* 46* 20 31* 22*   CREATININE mg/dL 1.79* 1.33* 2.25* 1.99* 1.45* 2.32* 1.84*   GLUCOSE mg/dL 114* 175* 86 316* 240* 258* 196*   CALCIUM mg/dL 8.6* 8.6* 9.4 9.2 8.7  9.4 8.7   PHOSPHORUS mg/dL  --  2.5 3.6  3.6 2.7  --   --   --              Radiology:  Imaging Results (last 72 hours)     Procedure Component Value Units Date/Time    Fiberoptic Endo (fees) [[id number]] Resulted:  06/09/18 1053     Updated:  06/09/18 1053    Narrative:       This procedure was auto-finalized with no dictation required.    XR Chest 1 View [[id number]] Updated:  06/09/18 0341    XR Chest 1 View [[id number]] Collected:  06/08/18 1143     Updated:  06/08/18 1157    Narrative:       EXAMINATION: XR CHEST 1 VW- 06/08/2018     INDICATION: post chest tube; U64-Fpdpjqk effusion, not elsewhere  classified; T14.8XXA-Other injury of unspecified body region, initial  encounter; L08.9-Local infection of the skin and subcutaneous tissue,  unspecified      COMPARISON: 06/08/2018     FINDINGS: A left chest tube has been inserted since the previous  examination of the same day. A chest tube is well-positioned. There has  been evacuation of pleural fluid since previous exam of the same day.   The tracheostomy tube and central venous catheter are well-positioned.  There is minimal residual bibasilar airspace disease.           Impression:       Following insertion of a left chest tube there has been  significant improved aeration of the left lung base with minimal  residual bibasilar airspace disease.     D:  06/08/2018  E:  06/08/2018     This report was finalized on 6/8/2018 11:55 AM by Dr. Anthony Perera MD.       XR Shoulder 1 View Left [[id number]] Collected:  06/08/18 1146     Updated:  06/08/18 1157    Narrative:       EXAMINATION: XR SHOULDER 1 VW LEFT- 06/08/2018     INDICATION: Y99-Eofzqon effusion, not elsewhere classified;  T14.8XXA-Other injury of unspecified body region, initial encounter;  L08.9-Local infection of the skin and subcutaneous tissue, unspecified      COMPARISON: NONE     FINDINGS: Images of the left shoulder reveal no fracture or dislocation.  A Port-A-Cath is seen as well as a left chest tube  visible on the  radiograph.           Impression:       Normal AP radiograph of the left shoulder.     D:  06/08/2018  E:  06/08/2018     This report was finalized on 6/8/2018 11:55 AM by Dr. Anthony Perera MD.       CT Chest Without Contrast [[id number]] Collected:  06/08/18 0338     Updated:  06/08/18 0639    Narrative:       EXAM:    CT Chest Without Intravenous Contrast    EXAM DATE/TIME:    6/8/2018 3:38 AM    CLINICAL HISTORY:    43 years old, female; Signs and symptoms; Shortness of breath; Additional   info: Aspiration, known or suspected    TECHNIQUE:    Axial computed tomography images of the chest without intravenous contrast.    All CT scans at this facility use one or more dose reduction techniques, viz.:   automated exposure control; ma/kV adjustment per patient size (including   targeted exams where dose is matched to indication; i.e. head); or iterative   reconstruction technique.    Coronal reformatted images were created and reviewed.    COMPARISON:    CT CHEST WO CONTRAST 2018-04-27 11:52    FINDINGS:    Lungs: Bilateral dependent airspace disease/atelectasis.    Pleural space: Moderate left greater than right pleural effusions.    Heart:  Unremarkable.  No cardiomegaly.  No significant pericardial effusion.    Bones/joints:  Unremarkable.  No acute fracture.  No dislocation.    Soft tissues:  Unremarkable.    Vasculature: Atheromatous vascular calcification.  No thoracic aortic   aneurysm.    Lymph nodes:  Unremarkable.  No enlarged lymph nodes.    Other findings: Tracheostomy. Right double lumen catheter. Left subclavian   line. Lower cervical fusion.      Impression:       Bilateral dependent airspace disease/atelectasis with moderate left greater   than right pleural effusions.  Tracheostomy, right double-lumen catheter, left subclavian line.  Atheromatous vascular calcification.  Lower cervical fusion.    THIS DOCUMENT HAS BEEN ELECTRONICALLY SIGNED BY IGOR CATES MD    XR Chest 1 View  [[id number]] Collected:  06/08/18 0330     Updated:  06/08/18 0636    Narrative:       EXAM:    XR Chest, 1 View    EXAM DATE/TIME:    6/8/2018 3:30 AM    CLINICAL HISTORY:    43 years old, female; Device placement; Ett placement (vent status); Patient   HX: Confirm endotracheal tube placement. Deepline placement, dialysis catheter.   Placed at an outside hospital new admit    TECHNIQUE:    Frontal view of the chest.    COMPARISON:    CR - XR CHEST 1 VW 2018-05-04 02:00    FINDINGS:    Lungs: Superimposed left lower lung and retrocardiac infiltrate not excluded.    Pleural space: Mild-moderate left pleural effusion.  No pneumothorax.    Heart:  Unremarkable.  No cardiomegaly.    Mediastinum:  Unremarkable.    Bones/joints:  Unremarkable.    Other findings: Tracheostomy. Right double lumen catheter with tip in the   SVC. Left subclavian line with tip in the SVC. Lower cervical fusion.      Impression:       Mild-moderate left pleural effusion with underlying infiltrate not excluded.  Tracheostomy, right double lumen catheter, left subclavian line.  Lower cervical fusion.    THIS DOCUMENT HAS BEEN ELECTRONICALLY SIGNED BY IGOR CATES MD          Renal Imaging:        IMPRESSION:  ESRD  ANEMIA  SEPSIS      RECOMMENDATIONS:     Dialysis tomorrow  Procrit for anemia  If hb drops give blood , prefer to be given on dialysis  Adding midodrine for low bp  Narayan Holguin MD  6/10/2018  9:57 AM         Self

## 2021-07-25 NOTE — ED STATDOCS - NSFOLLOWUPINSTRUCTIONS_ED_ALL_ED_FT
You have a presumptive diagnosis of diverticulitis.    Given you have always had resolution with augmentin and you look overall well we are going to treat you as such and forgo the CT scan.    Start the augmentin tomorrow (your first dose was here).    Call Dr. Perez tomorrow and schedule a recheck on Tuesday/Wednesday.    You must return or seek medical attention if you have any fever, vomiting, inability to eat or pain becomes severe.

## 2021-07-25 NOTE — ED STATDOCS - PATIENT PORTAL LINK FT
You can access the FollowMyHealth Patient Portal offered by [facility] by registering at the following website: [email or web address]. By joining [employer]’s FollowMyHealth portal, you will also be able to view your health information using other applications (apps) compatible with our system.

## 2021-07-25 NOTE — ED STATDOCS - CLINICAL SUMMARY MEDICAL DECISION MAKING FREE TEXT BOX
DAREN Awad, Attending: reviewed scribe note  hx of divertic x3, always uncomplicated and resolves with augmentin  offered presumptive abx treatment and analgesia without imaging  pt amenable, can see Dr. Perez in 2-3 days for recheck  non toxic, vitals reassuring, focally tender but not peritoneal  given return precautions  ERX for abx/oxy sent

## 2021-07-25 NOTE — ED STATDOCS - OBJECTIVE STATEMENT
57 y/o male with hx of diverticulitis x 3 in the past never had complications, always resolved with augment, lower chronic pain x hours. no fever, no vomiting. pt amenable to forgoing ct scan and initiating antibiotic treatment, has PCP Dr. Perez, able to see this week in clinic. has had reassuring colonoscopy in the past approx 3 years ago. 57 y/o male with hx of diverticulitis x 3 in the past never had complications, always resolved with augmentin, lower abd pain x hours. no fever, no vomiting. pt amenable to forgoing ct scan and initiating antibiotic treatment, has PCP Dr. Perez, able to see this week in clinic. has had reassuring colonoscopy in the past approx 3 years ago.    - JH, LLQ pain c/w with similar divertic, always resolve with augmenin, has good pcp f/u, tolerating PO, no f/c, no  sx, no masses

## 2021-07-25 NOTE — ED ADULT TRIAGE NOTE - CHIEF COMPLAINT QUOTE
pt presents to Ed with complaints of lower abdominal cramping pain. pt has hx of diverticulitis and reports it feels the same as past attacks.

## 2021-08-18 ENCOUNTER — RESULT REVIEW (OUTPATIENT)
Age: 56
End: 2021-08-18

## 2022-02-08 ENCOUNTER — APPOINTMENT (OUTPATIENT)
Dept: OTOLARYNGOLOGY | Facility: CLINIC | Age: 57
End: 2022-02-08
Payer: COMMERCIAL

## 2022-02-08 VITALS
BODY MASS INDEX: 32.14 KG/M2 | TEMPERATURE: 97.6 F | HEART RATE: 67 BPM | WEIGHT: 217 LBS | SYSTOLIC BLOOD PRESSURE: 135 MMHG | DIASTOLIC BLOOD PRESSURE: 80 MMHG | HEIGHT: 69 IN

## 2022-02-08 DIAGNOSIS — Z78.9 OTHER SPECIFIED HEALTH STATUS: ICD-10-CM

## 2022-02-08 DIAGNOSIS — Q38.5 CONGENITAL MALFORMATIONS OF PALATE, NOT ELSEWHERE CLASSIFIED: ICD-10-CM

## 2022-02-08 DIAGNOSIS — K57.32 DIVERTICULITIS OF LARGE INTESTINE W/OUT PERFORATION OR ABSCESS W/OUT BLEEDING: ICD-10-CM

## 2022-02-08 DIAGNOSIS — R06.83 SNORING: ICD-10-CM

## 2022-02-08 DIAGNOSIS — R68.89 OTHER GENERAL SYMPTOMS AND SIGNS: ICD-10-CM

## 2022-02-08 DIAGNOSIS — F17.210 NICOTINE DEPENDENCE, CIGARETTES, UNCOMPLICATED: ICD-10-CM

## 2022-02-08 PROCEDURE — 99204 OFFICE O/P NEW MOD 45 MIN: CPT | Mod: 25

## 2022-02-08 PROCEDURE — 31575 DIAGNOSTIC LARYNGOSCOPY: CPT

## 2022-02-08 RX ORDER — LOSARTAN POTASSIUM 100 MG/1
100 TABLET, FILM COATED ORAL
Refills: 0 | Status: ACTIVE | COMMUNITY

## 2022-02-08 RX ORDER — ACETAMINOPHEN 325 MG/1
TABLET, FILM COATED ORAL
Refills: 0 | Status: ACTIVE | COMMUNITY

## 2022-02-08 NOTE — REVIEW OF SYSTEMS
[Negative] : Heme/Lymph [Problem Snoring] : problem snoring [Snoring With Pauses] : snoring with pauses [Patient Intake Form Reviewed] : Patient intake form was reviewed

## 2022-02-08 NOTE — HISTORY OF PRESENT ILLNESS
[de-identified] : co snoring loud\par occasional apnea noted\par no daytime fatigue\par no nasal congestion\par no dysphagia\par sp tonsillectomy as child

## 2022-02-08 NOTE — PHYSICAL EXAM
[Midline] : trachea located in midline position [Normal] : no rashes [de-identified] : elongated uvula and lax palate

## 2022-02-08 NOTE — PROCEDURE
[de-identified] : Indication for procedure:Unable to examine laryngeal structures with mirror exam.  Difficulty w persistent throat discomfort and excessive\par throat clearing\par The patient has \par \par Scope # \par Topical anesthesia with viscous xylocaine 2% is applied to the anterior nares.\par A flexible fiberoptic laryngoscope is than introduced through the nares.\par The nasopharynx is clear without mass or inflammation.\par The posterior pharyngeal wall is unremarkable.\par The tongue base and vallecula are unremarkable.  The hypopharynx is unremarkable and unobstructed.\par The supraglottic larynx is within normal limits.\par Both vocal cords are fully mobile with no nodule, polyp or other lesion present.\par There is no edema or erythema overlying the arytenoid cartilages or the inter-arytenoid space.\par The subglottic space is clear.\par The voice has a normal quality.\par

## 2022-02-08 NOTE — ASSESSMENT
[FreeTextEntry1] : lax palate and elongated uvula\par question of sleep apnea\par sleep study home\par if test neg\par Radiofrequency uvula and palate performed with local anesthesia in office. The procedure itself takes about 5-10 minutes. Typically 3 treatments  by 5 weeks to allow swelling of palate to resolve.Usually tylenol or advil all that is needed for pain.  No restriction of diet.  Voice may sound distorted for 2-3 days due to swelling of uvula.\par \par If heat from treatment too close to surface you may develop shallow ulcer which will heal over 7-10 days and needs no treatment. Will not adversely effect outcome of treatment for snoring.\par

## 2022-02-16 ENCOUNTER — APPOINTMENT (OUTPATIENT)
Age: 57
End: 2022-02-16

## 2022-02-24 ENCOUNTER — APPOINTMENT (OUTPATIENT)
Age: 57
End: 2022-02-24
Payer: COMMERCIAL

## 2022-02-24 ENCOUNTER — OUTPATIENT (OUTPATIENT)
Dept: OUTPATIENT SERVICES | Facility: HOSPITAL | Age: 57
LOS: 1 days | End: 2022-02-24
Payer: COMMERCIAL

## 2022-02-24 DIAGNOSIS — G47.33 OBSTRUCTIVE SLEEP APNEA (ADULT) (PEDIATRIC): ICD-10-CM

## 2022-02-24 PROCEDURE — 95800 SLP STDY UNATTENDED: CPT

## 2022-03-17 NOTE — ED ADULT NURSE NOTE - NS ED NOTE ABUSE SUSPICION NEGLECT YN
LVM for pt to call and sched preop appt with Dr Saint Santos prior to surgery. Pt called back and states she has a appt with GI Dr for issues with gallbladder. and will call back to sched a preop after her appt with the GI  No

## 2022-03-21 ENCOUNTER — APPOINTMENT (OUTPATIENT)
Dept: PULMONOLOGY | Facility: CLINIC | Age: 57
End: 2022-03-21
Payer: COMMERCIAL

## 2022-03-21 VITALS
WEIGHT: 217 LBS | SYSTOLIC BLOOD PRESSURE: 128 MMHG | OXYGEN SATURATION: 97 % | HEART RATE: 66 BPM | DIASTOLIC BLOOD PRESSURE: 74 MMHG | HEIGHT: 69 IN | RESPIRATION RATE: 16 BRPM | BODY MASS INDEX: 32.14 KG/M2

## 2022-03-21 DIAGNOSIS — E78.5 HYPERLIPIDEMIA, UNSPECIFIED: ICD-10-CM

## 2022-03-21 DIAGNOSIS — Z87.19 PERSONAL HISTORY OF OTHER DISEASES OF THE DIGESTIVE SYSTEM: ICD-10-CM

## 2022-03-21 DIAGNOSIS — Z86.79 PERSONAL HISTORY OF OTHER DISEASES OF THE CIRCULATORY SYSTEM: ICD-10-CM

## 2022-03-21 DIAGNOSIS — Z78.9 OTHER SPECIFIED HEALTH STATUS: ICD-10-CM

## 2022-03-21 DIAGNOSIS — G47.33 OBSTRUCTIVE SLEEP APNEA (ADULT) (PEDIATRIC): ICD-10-CM

## 2022-03-21 DIAGNOSIS — G47.10 HYPERSOMNIA, UNSPECIFIED: ICD-10-CM

## 2022-03-21 DIAGNOSIS — J31.2 CHRONIC PHARYNGITIS: ICD-10-CM

## 2022-03-21 DIAGNOSIS — I10 ESSENTIAL (PRIMARY) HYPERTENSION: ICD-10-CM

## 2022-03-21 DIAGNOSIS — F17.290 NICOTINE DEPENDENCE, OTHER TOBACCO PRODUCT, UNCOMPLICATED: ICD-10-CM

## 2022-03-21 PROCEDURE — 99204 OFFICE O/P NEW MOD 45 MIN: CPT

## 2024-09-13 ENCOUNTER — EMERGENCY (EMERGENCY)
Facility: HOSPITAL | Age: 59
LOS: 0 days | Discharge: HOME | End: 2024-09-13
Attending: EMERGENCY MEDICINE
Payer: COMMERCIAL

## 2024-09-13 VITALS
TEMPERATURE: 98 F | OXYGEN SATURATION: 96 % | DIASTOLIC BLOOD PRESSURE: 64 MMHG | SYSTOLIC BLOOD PRESSURE: 123 MMHG | RESPIRATION RATE: 18 BRPM | WEIGHT: 207.01 LBS | HEART RATE: 62 BPM

## 2024-09-13 VITALS
HEART RATE: 56 BPM | RESPIRATION RATE: 16 BRPM | SYSTOLIC BLOOD PRESSURE: 131 MMHG | DIASTOLIC BLOOD PRESSURE: 76 MMHG | OXYGEN SATURATION: 99 %

## 2024-09-13 DIAGNOSIS — K57.92 DIVERTICULITIS OF INTESTINE, PART UNSPECIFIED, WITHOUT PERFORATION OR ABSCESS WITHOUT BLEEDING: ICD-10-CM

## 2024-09-13 DIAGNOSIS — Z88.1 ALLERGY STATUS TO OTHER ANTIBIOTIC AGENTS: ICD-10-CM

## 2024-09-13 DIAGNOSIS — R10.9 UNSPECIFIED ABDOMINAL PAIN: ICD-10-CM

## 2024-09-13 LAB
ALBUMIN SERPL ELPH-MCNC: 3.8 G/DL — SIGNIFICANT CHANGE UP (ref 3.3–5)
ALP SERPL-CCNC: 53 U/L — SIGNIFICANT CHANGE UP (ref 40–120)
ALT FLD-CCNC: 35 U/L — SIGNIFICANT CHANGE UP (ref 12–78)
ANION GAP SERPL CALC-SCNC: 5 MMOL/L — SIGNIFICANT CHANGE UP (ref 5–17)
APPEARANCE UR: CLEAR — SIGNIFICANT CHANGE UP
AST SERPL-CCNC: 20 U/L — SIGNIFICANT CHANGE UP (ref 15–37)
BASOPHILS # BLD AUTO: 0.01 K/UL — SIGNIFICANT CHANGE UP (ref 0–0.2)
BASOPHILS NFR BLD AUTO: 0.2 % — SIGNIFICANT CHANGE UP (ref 0–2)
BILIRUB SERPL-MCNC: 0.4 MG/DL — SIGNIFICANT CHANGE UP (ref 0.2–1.2)
BILIRUB UR-MCNC: NEGATIVE — SIGNIFICANT CHANGE UP
BUN SERPL-MCNC: 11 MG/DL — SIGNIFICANT CHANGE UP (ref 7–23)
CALCIUM SERPL-MCNC: 9.7 MG/DL — SIGNIFICANT CHANGE UP (ref 8.5–10.1)
CHLORIDE SERPL-SCNC: 108 MMOL/L — SIGNIFICANT CHANGE UP (ref 96–108)
CO2 SERPL-SCNC: 27 MMOL/L — SIGNIFICANT CHANGE UP (ref 22–31)
COLOR SPEC: SIGNIFICANT CHANGE UP
CREAT SERPL-MCNC: 0.94 MG/DL — SIGNIFICANT CHANGE UP (ref 0.5–1.3)
DIFF PNL FLD: NEGATIVE — SIGNIFICANT CHANGE UP
EGFR: 92 ML/MIN/1.73M2 — SIGNIFICANT CHANGE UP
EOSINOPHIL # BLD AUTO: 0.11 K/UL — SIGNIFICANT CHANGE UP (ref 0–0.5)
EOSINOPHIL NFR BLD AUTO: 2.1 % — SIGNIFICANT CHANGE UP (ref 0–6)
GLUCOSE SERPL-MCNC: 113 MG/DL — HIGH (ref 70–99)
GLUCOSE UR QL: NEGATIVE MG/DL — SIGNIFICANT CHANGE UP
HCT VFR BLD CALC: 41.2 % — SIGNIFICANT CHANGE UP (ref 39–50)
HGB BLD-MCNC: 14.1 G/DL — SIGNIFICANT CHANGE UP (ref 13–17)
IMM GRANULOCYTES NFR BLD AUTO: 0.2 % — SIGNIFICANT CHANGE UP (ref 0–0.9)
KETONES UR-MCNC: ABNORMAL MG/DL
LEUKOCYTE ESTERASE UR-ACNC: NEGATIVE — SIGNIFICANT CHANGE UP
LIDOCAIN IGE QN: 44 U/L — SIGNIFICANT CHANGE UP (ref 13–75)
LYMPHOCYTES # BLD AUTO: 1.51 K/UL — SIGNIFICANT CHANGE UP (ref 1–3.3)
LYMPHOCYTES # BLD AUTO: 28.5 % — SIGNIFICANT CHANGE UP (ref 13–44)
MCHC RBC-ENTMCNC: 30.3 PG — SIGNIFICANT CHANGE UP (ref 27–34)
MCHC RBC-ENTMCNC: 34.2 GM/DL — SIGNIFICANT CHANGE UP (ref 32–36)
MCV RBC AUTO: 88.4 FL — SIGNIFICANT CHANGE UP (ref 80–100)
MONOCYTES # BLD AUTO: 0.33 K/UL — SIGNIFICANT CHANGE UP (ref 0–0.9)
MONOCYTES NFR BLD AUTO: 6.2 % — SIGNIFICANT CHANGE UP (ref 2–14)
NEUTROPHILS # BLD AUTO: 3.33 K/UL — SIGNIFICANT CHANGE UP (ref 1.8–7.4)
NEUTROPHILS NFR BLD AUTO: 62.8 % — SIGNIFICANT CHANGE UP (ref 43–77)
NITRITE UR-MCNC: NEGATIVE — SIGNIFICANT CHANGE UP
PH UR: 5 — SIGNIFICANT CHANGE UP (ref 5–8)
PLATELET # BLD AUTO: 225 K/UL — SIGNIFICANT CHANGE UP (ref 150–400)
POTASSIUM SERPL-MCNC: 4 MMOL/L — SIGNIFICANT CHANGE UP (ref 3.5–5.3)
POTASSIUM SERPL-SCNC: 4 MMOL/L — SIGNIFICANT CHANGE UP (ref 3.5–5.3)
PROT SERPL-MCNC: 6.6 GM/DL — SIGNIFICANT CHANGE UP (ref 6–8.3)
PROT UR-MCNC: SIGNIFICANT CHANGE UP MG/DL
RBC # BLD: 4.66 M/UL — SIGNIFICANT CHANGE UP (ref 4.2–5.8)
RBC # FLD: 12.3 % — SIGNIFICANT CHANGE UP (ref 10.3–14.5)
SODIUM SERPL-SCNC: 140 MMOL/L — SIGNIFICANT CHANGE UP (ref 135–145)
SP GR SPEC: >1.03 — HIGH (ref 1–1.03)
UROBILINOGEN FLD QL: 1 MG/DL — SIGNIFICANT CHANGE UP (ref 0.2–1)
WBC # BLD: 5.3 K/UL — SIGNIFICANT CHANGE UP (ref 3.8–10.5)
WBC # FLD AUTO: 5.3 K/UL — SIGNIFICANT CHANGE UP (ref 3.8–10.5)

## 2024-09-13 PROCEDURE — 96374 THER/PROPH/DIAG INJ IV PUSH: CPT | Mod: XU

## 2024-09-13 PROCEDURE — 83690 ASSAY OF LIPASE: CPT

## 2024-09-13 PROCEDURE — 99285 EMERGENCY DEPT VISIT HI MDM: CPT

## 2024-09-13 PROCEDURE — 74177 CT ABD & PELVIS W/CONTRAST: CPT | Mod: 26,MC

## 2024-09-13 PROCEDURE — 81003 URINALYSIS AUTO W/O SCOPE: CPT

## 2024-09-13 PROCEDURE — 85025 COMPLETE CBC W/AUTO DIFF WBC: CPT

## 2024-09-13 PROCEDURE — 74177 CT ABD & PELVIS W/CONTRAST: CPT | Mod: MC

## 2024-09-13 PROCEDURE — 99284 EMERGENCY DEPT VISIT MOD MDM: CPT | Mod: 25

## 2024-09-13 PROCEDURE — 80053 COMPREHEN METABOLIC PANEL: CPT

## 2024-09-13 PROCEDURE — 36415 COLL VENOUS BLD VENIPUNCTURE: CPT

## 2024-09-13 RX ORDER — AMOXICILLIN AND CLAVULANATE POTASSIUM 250; 125 MG/1; MG/1
1 TABLET, FILM COATED ORAL ONCE
Refills: 0 | Status: COMPLETED | OUTPATIENT
Start: 2024-09-13 | End: 2024-09-13

## 2024-09-13 RX ORDER — SODIUM CHLORIDE 9 MG/ML
1000 INJECTION INTRAMUSCULAR; INTRAVENOUS; SUBCUTANEOUS ONCE
Refills: 0 | Status: COMPLETED | OUTPATIENT
Start: 2024-09-13 | End: 2024-09-13

## 2024-09-13 RX ORDER — OXYCODONE AND ACETAMINOPHEN 7.5; 325 MG/1; MG/1
1 TABLET ORAL
Qty: 8 | Refills: 0
Start: 2024-09-13

## 2024-09-13 RX ORDER — ONDANSETRON 2 MG/ML
1 INJECTION, SOLUTION INTRAMUSCULAR; INTRAVENOUS
Qty: 1 | Refills: 0
Start: 2024-09-13

## 2024-09-13 RX ORDER — AMOXICILLIN AND CLAVULANATE POTASSIUM 250; 125 MG/1; MG/1
875 TABLET, FILM COATED ORAL
Qty: 30 | Refills: 0
Start: 2024-09-13 | End: 2024-09-22

## 2024-09-13 RX ADMIN — SODIUM CHLORIDE 2000 MILLILITER(S): 9 INJECTION INTRAMUSCULAR; INTRAVENOUS; SUBCUTANEOUS at 07:38

## 2024-09-13 RX ADMIN — AMOXICILLIN AND CLAVULANATE POTASSIUM 1 TABLET(S): 250; 125 TABLET, FILM COATED ORAL at 09:23

## 2024-09-13 RX ADMIN — Medication 4 MILLIGRAM(S): at 07:37

## 2024-09-13 NOTE — ED PROVIDER NOTE - OBJECTIVE STATEMENT
59-year-old patient with past medical history of diverticulitis, presents the emergency department for left-sided abdominal pain.  Patient has GI appointment on November 5.  Patient states he has been dealing with some abdominal pain on and off for last month.  Over the last couple of days the pain has increased.  Patient had amoxicillin and tried taking yesterday.  Pain is not getting any better and came to the emergency department for evaluation, with wife.   no trauma, no nausea/vomiting/diarrhea.

## 2024-09-13 NOTE — ED PROVIDER NOTE - CLINICAL SUMMARY MEDICAL DECISION MAKING FREE TEXT BOX
60-year-old patient presents emergency department with abdominal pain.  Concern for diverticulitis.  Plan check labs, CT scan.

## 2024-09-13 NOTE — ED ADULT TRIAGE NOTE - CHIEF COMPLAINT QUOTE
Pt presents to ED c/o worsening abdominal pain over the past few weeks. States he has a pmh of diverticulitis and is "pretty sure it's a flare up". Took 2 Tylenol about 1 hour pta. Denies n/v/d, chills, or any other complaints. Allergy to Cipro.

## 2024-09-13 NOTE — ED PROVIDER NOTE - PHYSICAL EXAMINATION
Constitutional: NAD AAOx3  Eyes: EOMI, pupils equal  Head: Normocephalic atraumatic  Mouth: no airway obstruction  Cardiac: regular rate   Resp: Lungs CTAB  GI: Abd s/mild pain palp LLQ/ND  Neuro: CN2-12 intact  Skin: No rashes

## 2024-09-13 NOTE — ED PROVIDER NOTE - NSICDXPASTMEDICALHX_GEN_ALL_CORE_FT
PAST MEDICAL HISTORY:  CAD (coronary artery disease)     Diverticulosis of intestine without bleeding, unspecified intestinal tract location     Stented coronary artery

## 2024-09-13 NOTE — ED PROVIDER NOTE - NSFOLLOWUPINSTRUCTIONS_ED_ALL_ED_FT
Please call and follow up with your doctor in 3-5 days.     Use Tylenol (acetaminophen) 650mg every 6 hours and Motrin (Ibuprofen) 600 mg every 6 hours as need for pain.    Use percocet  1 pill every 6 hours for breakthrough pain.    Return to the Emergency Department for worsening or persistent symptoms, and/or ANY NEW OR CONCERNING SYMPTOMS. If you have issues obtaining follow up, please call: [phone] or [phone]  to obtain a doctor or specialist who takes your insurance in your area.    What is diverticulitis?  Diverticulitis is a condition that causes small pockets along your intestine called diverticula to become inflamed or infected. This is caused by hard bowel movement, food, or bacteria that get stuck in the pockets.    Diverticula  What are the signs and symptoms of diverticulitis?  Pain in the lower left side of your abdomen  Fever and chills  Nausea or vomiting  Constipation or diarrhea  An urge to urinate or have a bowel movement more often than usual  Bloody bowel movements  Bloating and gas  How is diverticulitis diagnosed?  Your healthcare provider will examine you. He or she will ask questions about your symptoms and health history. You may need any of the following:    Blood and urine tests may show signs of infection or inflammation.  CT scan or ultrasound pictures may be used to find problems in your intestines. You may be given contrast liquid to help your intestines show up better in the pictures. Tell the healthcare provider if you have ever had an allergic reaction to contrast liquid.  A colonoscopy is used to look at your intestines with a scope. A scope (long bendable tube with a light on the end) is used to take pictures. This test may show swollen diverticula or bleeding. Samples may be taken from your digestive tract and sent to a lab for tests. Bleeding may be controlled with tools that are inserted through the scope.    How is diverticulitis treated?  Mild diverticulitis can be treated at home. You may need to rest and follow a clear liquid diet until your diverticulitis gets better. You will be admitted to the hospital if you have severe diverticulitis. You may need any of the following:    Antibiotics help treat or prevent a bacterial infection.  Prescription pain medicine may be given. Ask your healthcare provider how to take this medicine safely. Some prescription pain medicines contain acetaminophen. Do not take other medicines that contain acetaminophen without talking to your healthcare provider. Too much acetaminophen may cause liver damage. Prescription pain medicine may cause constipation. Ask your healthcare provider how to prevent or treat constipation.  An IV may be used to give you liquids and nutrition. You may not be able to eat or drink anything until your healthcare provider says it is okay.  Drainage may be done to reduce inflammation or treat infection. Your healthcare provider may insert a small tube through an incision in your abdomen to drain pus from infected diverticula.  Surgery may be needed if there is a hole in your bowel or a large amount of swelling. A healthcare provider will remove the infected or inflamed areas of your colon.  Treatment options  The following list of medications are related to or used in the treatment of this condition.    metronidazole  Flagyl  Cipro  ciprofloxacin  Cipro I.V.  View moretreatment options    How can I manage my symptoms?  A clear liquid diet will allow your intestines to heal. A clear liquid diet includes any liquids that you can see through. Examples include water, ginger-deidra, cranberry or apple juice, frozen fruit ice, or broth. Ask your healthcare provider for more information on a clear liquid diet.    When should I seek immediate care?  You have bowel movement or foul-smelling discharge leaking from your vagina or in your urine.  You have severe diarrhea.  You urinate less than usual or not at all.  You are not able to have a bowel movement.  You cannot stop vomiting.  You have severe abdominal pain, a fever, and your abdomen is larger than usual.  You have new or increased blood in your bowel movements.  When should I call my doctor?  You have pain when you urinate.  Your symptoms get worse or do not go away.  You have questions or concerns about your condition or care.

## 2024-09-13 NOTE — ED ADULT NURSE NOTE - OBJECTIVE STATEMENT
pt presents to ED from home for abdominal pain. states hx diverticulitis and that this pain feels similar to previous flare ups. denies nausea, vomiting, fever. tylenol taken PTA.

## 2024-09-13 NOTE — ED PROVIDER NOTE - PROGRESS NOTE DETAILS
ED Attending Dr. Lemus, pt and wife updated on results of tests.  Pt had GI - Dr. Negron to follow with and informed pt to bring copy of ct results to GI.

## 2024-09-13 NOTE — ED PROVIDER NOTE - PATIENT PORTAL LINK FT
You can access the FollowMyHealth Patient Portal offered by [facility] by registering at the following website: [email or web address]. By joining [employer]’s FollowMyHealth portal, you will also be able to view your health information using other applications (apps) compatible with our system.

## 2025-03-13 ENCOUNTER — EMERGENCY (EMERGENCY)
Facility: HOSPITAL | Age: 60
LOS: 1 days | Discharge: ANOTHER HOSPITAL | End: 2025-03-13
Attending: STUDENT IN AN ORGANIZED HEALTH CARE EDUCATION/TRAINING PROGRAM
Payer: COMMERCIAL

## 2025-03-13 VITALS
DIASTOLIC BLOOD PRESSURE: 82 MMHG | TEMPERATURE: 98 F | OXYGEN SATURATION: 100 % | SYSTOLIC BLOOD PRESSURE: 174 MMHG | WEIGHT: 210.1 LBS | RESPIRATION RATE: 19 BRPM | HEART RATE: 81 BPM

## 2025-03-13 DIAGNOSIS — R55 SYNCOPE AND COLLAPSE: ICD-10-CM

## 2025-03-13 DIAGNOSIS — S00.211A ABRASION OF RIGHT EYELID AND PERIOCULAR AREA, INITIAL ENCOUNTER: ICD-10-CM
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
## 2025-03-13 DIAGNOSIS — Z88.1 ALLERGY STATUS TO OTHER ANTIBIOTIC AGENTS: ICD-10-CM

## 2025-03-13 DIAGNOSIS — M25.561 PAIN IN RIGHT KNEE: ICD-10-CM

## 2025-03-13 DIAGNOSIS — I10 ESSENTIAL (PRIMARY) HYPERTENSION: ICD-10-CM

## 2025-03-13 DIAGNOSIS — Z95.5 PRESENCE OF CORONARY ANGIOPLASTY IMPLANT AND GRAFT: ICD-10-CM
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
## 2025-03-13 DIAGNOSIS — I25.10 ATHEROSCLEROTIC HEART DISEASE OF NATIVE CORONARY ARTERY WITHOUT ANGINA PECTORIS: ICD-10-CM

## 2025-03-13 LAB
ALBUMIN SERPL ELPH-MCNC: 4.3 G/DL — SIGNIFICANT CHANGE UP (ref 3.3–5)
ALP SERPL-CCNC: 54 U/L — SIGNIFICANT CHANGE UP (ref 40–120)
ALT FLD-CCNC: 49 U/L — SIGNIFICANT CHANGE UP (ref 12–78)
ANION GAP SERPL CALC-SCNC: 5 MMOL/L — SIGNIFICANT CHANGE UP (ref 5–17)
APTT BLD: 33.1 SEC — SIGNIFICANT CHANGE UP (ref 24.5–35.6)
AST SERPL-CCNC: 20 U/L — SIGNIFICANT CHANGE UP (ref 15–37)
BASOPHILS # BLD AUTO: 0.02 K/UL — SIGNIFICANT CHANGE UP (ref 0–0.2)
BASOPHILS NFR BLD AUTO: 0.4 % — SIGNIFICANT CHANGE UP (ref 0–2)
BILIRUB SERPL-MCNC: 0.4 MG/DL — SIGNIFICANT CHANGE UP (ref 0.2–1.2)
BUN SERPL-MCNC: 9 MG/DL — SIGNIFICANT CHANGE UP (ref 7–23)
CALCIUM SERPL-MCNC: 9.6 MG/DL — SIGNIFICANT CHANGE UP (ref 8.5–10.1)
CHLORIDE SERPL-SCNC: 104 MMOL/L — SIGNIFICANT CHANGE UP (ref 96–108)
CO2 SERPL-SCNC: 27 MMOL/L — SIGNIFICANT CHANGE UP (ref 22–31)
CREAT SERPL-MCNC: 0.84 MG/DL — SIGNIFICANT CHANGE UP (ref 0.5–1.3)
EGFR: 99 ML/MIN/1.73M2 — SIGNIFICANT CHANGE UP
EGFR: 99 ML/MIN/1.73M2 — SIGNIFICANT CHANGE UP
EOSINOPHIL # BLD AUTO: 0.03 K/UL — SIGNIFICANT CHANGE UP (ref 0–0.5)
EOSINOPHIL NFR BLD AUTO: 0.5 % — SIGNIFICANT CHANGE UP (ref 0–6)
GLUCOSE SERPL-MCNC: 103 MG/DL — HIGH (ref 70–99)
HCT VFR BLD CALC: 43.9 % — SIGNIFICANT CHANGE UP (ref 39–50)
HGB BLD-MCNC: 15.3 G/DL — SIGNIFICANT CHANGE UP (ref 13–17)
IMM GRANULOCYTES # BLD AUTO: 0.02 K/UL — SIGNIFICANT CHANGE UP (ref 0–0.07)
IMM GRANULOCYTES NFR BLD AUTO: 0.4 % — SIGNIFICANT CHANGE UP (ref 0–0.9)
INR BLD: 1 RATIO — SIGNIFICANT CHANGE UP (ref 0.85–1.16)
LYMPHOCYTES # BLD AUTO: 1.33 K/UL — SIGNIFICANT CHANGE UP (ref 1–3.3)
LYMPHOCYTES NFR BLD AUTO: 23.8 % — SIGNIFICANT CHANGE UP (ref 13–44)
MAGNESIUM SERPL-MCNC: 2.5 MG/DL — SIGNIFICANT CHANGE UP (ref 1.6–2.6)
MCHC RBC-ENTMCNC: 29.9 PG — SIGNIFICANT CHANGE UP (ref 27–34)
MCHC RBC-ENTMCNC: 34.9 G/DL — SIGNIFICANT CHANGE UP (ref 32–36)
MCV RBC AUTO: 85.9 FL — SIGNIFICANT CHANGE UP (ref 80–100)
MONOCYTES # BLD AUTO: 0.33 K/UL — SIGNIFICANT CHANGE UP (ref 0–0.9)
MONOCYTES NFR BLD AUTO: 5.9 % — SIGNIFICANT CHANGE UP (ref 2–14)
NEUTROPHILS # BLD AUTO: 3.87 K/UL — SIGNIFICANT CHANGE UP (ref 1.8–7.4)
NEUTROPHILS NFR BLD AUTO: 69 % — SIGNIFICANT CHANGE UP (ref 43–77)
NRBC # BLD AUTO: 0 K/UL — SIGNIFICANT CHANGE UP (ref 0–0)
NRBC # FLD: 0 K/UL — SIGNIFICANT CHANGE UP (ref 0–0)
NRBC BLD AUTO-RTO: 0 /100 WBCS — SIGNIFICANT CHANGE UP (ref 0–0)
NT-PROBNP SERPL-SCNC: 12 PG/ML — SIGNIFICANT CHANGE UP (ref 0–125)
PLATELET # BLD AUTO: 201 K/UL — SIGNIFICANT CHANGE UP (ref 150–400)
PMV BLD: 8.6 FL — SIGNIFICANT CHANGE UP (ref 7–13)
POTASSIUM SERPL-MCNC: 3.6 MMOL/L — SIGNIFICANT CHANGE UP (ref 3.5–5.3)
POTASSIUM SERPL-SCNC: 3.6 MMOL/L — SIGNIFICANT CHANGE UP (ref 3.5–5.3)
PROT SERPL-MCNC: 7.1 GM/DL — SIGNIFICANT CHANGE UP (ref 6–8.3)
PROTHROM AB SERPL-ACNC: 11.5 SEC — SIGNIFICANT CHANGE UP (ref 9.9–13.4)
RBC # BLD: 5.11 M/UL — SIGNIFICANT CHANGE UP (ref 4.2–5.8)
RBC # FLD: 11.9 % — SIGNIFICANT CHANGE UP (ref 10.3–14.5)
SODIUM SERPL-SCNC: 136 MMOL/L — SIGNIFICANT CHANGE UP (ref 135–145)
TROPONIN I, HIGH SENSITIVITY RESULT: 4.66 NG/L — SIGNIFICANT CHANGE UP
WBC # BLD: 5.6 K/UL — SIGNIFICANT CHANGE UP (ref 3.8–10.5)
WBC # FLD AUTO: 5.6 K/UL — SIGNIFICANT CHANGE UP (ref 3.8–10.5)

## 2025-03-13 PROCEDURE — 84484 ASSAY OF TROPONIN QUANT: CPT

## 2025-03-13 PROCEDURE — 85610 PROTHROMBIN TIME: CPT

## 2025-03-13 PROCEDURE — 99285 EMERGENCY DEPT VISIT HI MDM: CPT

## 2025-03-13 PROCEDURE — 71275 CT ANGIOGRAPHY CHEST: CPT | Mod: MC

## 2025-03-13 PROCEDURE — 70450 CT HEAD/BRAIN W/O DYE: CPT | Mod: MC

## 2025-03-13 PROCEDURE — 73564 X-RAY EXAM KNEE 4 OR MORE: CPT | Mod: RT

## 2025-03-13 PROCEDURE — 85379 FIBRIN DEGRADATION QUANT: CPT

## 2025-03-13 PROCEDURE — 36415 COLL VENOUS BLD VENIPUNCTURE: CPT

## 2025-03-13 PROCEDURE — 80053 COMPREHEN METABOLIC PANEL: CPT

## 2025-03-13 PROCEDURE — 72125 CT NECK SPINE W/O DYE: CPT | Mod: MC

## 2025-03-13 PROCEDURE — 70450 CT HEAD/BRAIN W/O DYE: CPT | Mod: 26

## 2025-03-13 PROCEDURE — 83880 ASSAY OF NATRIURETIC PEPTIDE: CPT

## 2025-03-13 PROCEDURE — 93010 ELECTROCARDIOGRAM REPORT: CPT

## 2025-03-13 PROCEDURE — 71045 X-RAY EXAM CHEST 1 VIEW: CPT | Mod: 26

## 2025-03-13 PROCEDURE — 99285 EMERGENCY DEPT VISIT HI MDM: CPT | Mod: 25

## 2025-03-13 PROCEDURE — 96374 THER/PROPH/DIAG INJ IV PUSH: CPT

## 2025-03-13 PROCEDURE — 93005 ELECTROCARDIOGRAM TRACING: CPT

## 2025-03-13 PROCEDURE — 85730 THROMBOPLASTIN TIME PARTIAL: CPT

## 2025-03-13 PROCEDURE — 72125 CT NECK SPINE W/O DYE: CPT | Mod: 26

## 2025-03-13 PROCEDURE — 71045 X-RAY EXAM CHEST 1 VIEW: CPT

## 2025-03-13 PROCEDURE — 83735 ASSAY OF MAGNESIUM: CPT

## 2025-03-13 PROCEDURE — 71275 CT ANGIOGRAPHY CHEST: CPT | Mod: 26

## 2025-03-13 PROCEDURE — 73564 X-RAY EXAM KNEE 4 OR MORE: CPT | Mod: 26,RT

## 2025-03-13 PROCEDURE — 85025 COMPLETE CBC W/AUTO DIFF WBC: CPT

## 2025-03-13 RX ORDER — ACETAMINOPHEN 500 MG/5ML
1000 LIQUID (ML) ORAL ONCE
Refills: 0 | Status: COMPLETED | OUTPATIENT
Start: 2025-03-13 | End: 2025-03-13

## 2025-03-13 RX ADMIN — Medication 400 MILLIGRAM(S): at 21:30

## 2025-03-13 NOTE — ED ADULT NURSE NOTE - OBJECTIVE STATEMENT
Admission Reconciliation is Completed  Discharge Reconciliation is Completed
Pt. with PMH CAD with stent x1. diverticulosis, presents to ED c/o syncopal episode when drinking soda. fell to the ground and sustained head strike. abrasion noted to the R eyebrow and top oh head. reports still feeling lightheaded. The fall also exacerbated chronic R knee pain.   Assessment:   General: Well appearing, VSS,  Nuro: AOX4, Mood and behavior appropriate to situation.    Skin: intact  Head & Neck: WDL CT C-spine pending  CARD:  currently denies CP, peripheral pulses equal and palpable, cap refill WDL. No edema noted.   Respiratory: No respiratory distress, unlabored, normal rate and rhythm. Denies SOB, MOYER,   GI: Abdomen soft, distended. nontender.  : No reports of dysuria, frequency or urgency.   Musculoskeletal: Ambulates without assistance, ROM intact.   Safety: Pt. was instructed to call for assistance before ambulating and avoid sudden movements, Bed in low position with side rails up. Undressed and in a hospital gown and Non-slip socks. Placed on tele monitoring.

## 2025-03-13 NOTE — ED ADULT TRIAGE NOTE - CHIEF COMPLAINT QUOTE
BIBA from home with a syncope and fall onto his right face. is not on blood thinners. on losartan and lipitor. in c collar by EMS.

## 2025-03-13 NOTE — ED ADULT NURSE NOTE - NSFALLRISKINTERV_ED_ALL_ED
Assistance OOB with selected safe patient handling equipment if applicable/Communicate fall risk and risk factors to all staff, patient, and family/Orthostatic vital signs/Provide visual cue: yellow wristband, yellow gown, etc/Reinforce activity limits and safety measures with patient and family/Call bell, personal items and telephone in reach/Instruct patient to call for assistance before getting out of bed/chair/stretcher/Non-slip footwear applied when patient is off stretcher/[place] to call system/Physically safe environment - no spills, clutter or unnecessary equipment/Purposeful Proactive Rounding/Room/bathroom lighting operational, light cord in reach

## 2025-03-13 NOTE — ED ADULT NURSE REASSESSMENT NOTE - NS ED NURSE REASSESS COMMENT FT1
Pt resting in stretcher awake and alert, plan of care discussed, pt denies pain at this time and states he is feeling better after the administration of medication. comfort and safety maintained

## 2025-03-13 NOTE — ED PROVIDER NOTE - CLINICAL SUMMARY MEDICAL DECISION MAKING FREE TEXT BOX
59-year-old male past medical history of CAD status post stent, hypertension presents ED after apparent syncopal episode in his house without prodromal symptoms.  Patient complaining of headache after having head strike and right knee pain after twisting his knee when he passed out and fell.   Patient well-appearing, small abrasion lateral to the right eye, right knee pain no other signs of trauma and hemodynamically stable.  Differential for syncope includes not limited to ACS, dysrhythmia, electrolyte abnormality, PE especially with recent travel, some concern for ICH and knee fracture.  Plan for CT head, x-ray, labs, EKG, telemetry.  Reassess

## 2025-03-13 NOTE — ED PROVIDER NOTE - PHYSICAL EXAMINATION
GEN: Patient awake alert NAD.   HEENT: normocephalic, abrasion lateral to R eye, EOMI, no scleral icterus, moist MM  CARDIAC: RRR, S1, S2, no murmur.   PULM: CTA B/L no wheeze, rhonchi, rales.   ABD: soft NT, ND, no rebound no guarding,  MSK: Moving all extremities, no edema. 5/5 strength and full ROM in all extremities. R knee ttp    NEURO: A&Ox3, no focal neurological deficits, no CTL midline spinal ttp   SKIN: warm, dry, no rash.

## 2025-03-13 NOTE — ED PROVIDER NOTE - OBJECTIVE STATEMENT
59-year-old male past medical history of CAD status post stent, hypertension presents ED after apparent syncopal episode in his house without prodromal symptoms.  Patient complaining of headache after having head strike and right knee pain after twisting his knee when he passed out and fell.  Patient denied chest pain, shortness of breath, abdominal pain, nausea, vomiting, diaphoresis, dysuria, diarrhea.  Patient has had multiple recent travel occasions in the last several weeks to [place] and [place].

## 2025-03-13 NOTE — ED PROVIDER NOTE - PROGRESS NOTE DETAILS
Patient's workup with negative troponin, CTA is negative for PE.  Patient requires admission for syncopal workup.  Patient request transfer to [facility].  Spoke with patient's cardiologist Dr. Hudson  who accepts transfer.

## 2025-03-13 NOTE — ED ADULT NURSE REASSESSMENT NOTE - NS ED NURSE REASSESS COMMENT FT1
Assumed care at 1900, pt resting in stretcher awake and alert w visitor at bedside, pt denies chest pain and sob at this time, respirations even and unlabored, plan of care discussed, pt has no further concerns at this time, comfort and safety maintained

## 2025-03-14 VITALS
HEART RATE: 60 BPM | DIASTOLIC BLOOD PRESSURE: 85 MMHG | SYSTOLIC BLOOD PRESSURE: 149 MMHG | RESPIRATION RATE: 18 BRPM | OXYGEN SATURATION: 99 %

## 2025-03-15 ENCOUNTER — APPOINTMENT (OUTPATIENT)
Dept: ORTHOPEDIC SURGERY | Facility: CLINIC | Age: 60
End: 2025-03-15

## 2025-03-15 ENCOUNTER — APPOINTMENT (OUTPATIENT)
Dept: MRI IMAGING | Facility: CLINIC | Age: 60
End: 2025-03-15
Payer: COMMERCIAL

## 2025-03-15 DIAGNOSIS — I10 ESSENTIAL (PRIMARY) HYPERTENSION: ICD-10-CM

## 2025-03-15 DIAGNOSIS — E78.00 PURE HYPERCHOLESTEROLEMIA, UNSPECIFIED: ICD-10-CM

## 2025-03-15 PROCEDURE — 20610 DRAIN/INJ JOINT/BURSA W/O US: CPT | Mod: RT

## 2025-03-15 PROCEDURE — 99203 OFFICE O/P NEW LOW 30 MIN: CPT | Mod: 25

## 2025-03-15 PROCEDURE — 73562 X-RAY EXAM OF KNEE 3: CPT | Mod: RT

## 2025-03-15 PROCEDURE — 73721 MRI JNT OF LWR EXTRE W/O DYE: CPT | Mod: RT

## 2025-03-17 ENCOUNTER — APPOINTMENT (OUTPATIENT)
Dept: ORTHOPEDIC SURGERY | Facility: CLINIC | Age: 60
End: 2025-03-17
Payer: COMMERCIAL

## 2025-03-17 VITALS — WEIGHT: 210 LBS | BODY MASS INDEX: 30.4 KG/M2 | HEIGHT: 69.5 IN

## 2025-03-17 DIAGNOSIS — S83.104A UNSPECIFIED DISLOCATION OF RIGHT KNEE, INITIAL ENCOUNTER: ICD-10-CM

## 2025-03-17 DIAGNOSIS — M17.11 UNILATERAL PRIMARY OSTEOARTHRITIS, RIGHT KNEE: ICD-10-CM

## 2025-03-17 DIAGNOSIS — S80.01XA CONTUSION OF RIGHT KNEE, INITIAL ENCOUNTER: ICD-10-CM

## 2025-03-17 DIAGNOSIS — S83.411A SPRAIN OF MEDIAL COLLATERAL LIGAMENT OF RIGHT KNEE, INITIAL ENCOUNTER: ICD-10-CM

## 2025-03-17 PROCEDURE — 99204 OFFICE O/P NEW MOD 45 MIN: CPT

## 2025-05-08 ENCOUNTER — APPOINTMENT (OUTPATIENT)
Dept: ORTHOPEDIC SURGERY | Facility: CLINIC | Age: 60
End: 2025-05-08
Payer: COMMERCIAL

## 2025-05-08 VITALS — BODY MASS INDEX: 30.4 KG/M2 | HEIGHT: 69.5 IN | WEIGHT: 210 LBS

## 2025-05-08 DIAGNOSIS — S83.411A SPRAIN OF MEDIAL COLLATERAL LIGAMENT OF RIGHT KNEE, INITIAL ENCOUNTER: ICD-10-CM

## 2025-05-08 DIAGNOSIS — M17.11 UNILATERAL PRIMARY OSTEOARTHRITIS, RIGHT KNEE: ICD-10-CM

## 2025-05-08 DIAGNOSIS — S80.01XA CONTUSION OF RIGHT KNEE, INITIAL ENCOUNTER: ICD-10-CM

## 2025-05-08 PROCEDURE — 99213 OFFICE O/P EST LOW 20 MIN: CPT
